# Patient Record
Sex: FEMALE | Race: BLACK OR AFRICAN AMERICAN | Employment: OTHER | URBAN - NONMETROPOLITAN AREA
[De-identification: names, ages, dates, MRNs, and addresses within clinical notes are randomized per-mention and may not be internally consistent; named-entity substitution may affect disease eponyms.]

---

## 2020-12-04 ENCOUNTER — APPOINTMENT (OUTPATIENT)
Dept: GENERAL RADIOLOGY | Age: 70
DRG: 208 | End: 2020-12-04
Attending: HOSPITALIST
Payer: MEDICARE

## 2020-12-04 ENCOUNTER — HOSPITAL ENCOUNTER (INPATIENT)
Age: 70
LOS: 4 days | Discharge: HOME HEALTH | DRG: 208 | End: 2020-12-08
Attending: HOSPITALIST | Admitting: HOSPITALIST
Payer: MEDICARE

## 2020-12-04 ENCOUNTER — APPOINTMENT (OUTPATIENT)
Dept: CT IMAGING | Age: 70
DRG: 208 | End: 2020-12-04
Attending: HOSPITALIST
Payer: MEDICARE

## 2020-12-04 PROBLEM — J96.90 RESPIRATORY FAILURE (HCC): Status: ACTIVE | Noted: 2020-12-04

## 2020-12-04 LAB
ALBUMIN SERPL-MCNC: 3.9 G/DL (ref 3.5–5.2)
ALP BLD-CCNC: 128 U/L (ref 35–104)
ALT SERPL-CCNC: 55 U/L (ref 5–33)
AMMONIA: 19 UMOL/L (ref 11–51)
AMPHETAMINE SCREEN, URINE: NEGATIVE
ANION GAP SERPL CALCULATED.3IONS-SCNC: 12 MMOL/L (ref 7–19)
AST SERPL-CCNC: 144 U/L (ref 5–32)
BACTERIA: NEGATIVE /HPF
BARBITURATE SCREEN URINE: NEGATIVE
BASE EXCESS ARTERIAL: -8.4 MMOL/L (ref -2–2)
BENZODIAZEPINE SCREEN, URINE: POSITIVE
BILIRUB SERPL-MCNC: 0.6 MG/DL (ref 0.2–1.2)
BILIRUBIN URINE: NEGATIVE
BLOOD, URINE: NEGATIVE
BUN BLDV-MCNC: 27 MG/DL (ref 8–23)
CALCIUM SERPL-MCNC: 7.9 MG/DL (ref 8.8–10.2)
CANNABINOID SCREEN URINE: NEGATIVE
CARBOXYHEMOGLOBIN ARTERIAL: 1.9 % (ref 0–5)
CHLORIDE BLD-SCNC: 113 MMOL/L (ref 98–111)
CLARITY: CLEAR
CO2: 19 MMOL/L (ref 22–29)
COCAINE METABOLITE SCREEN URINE: NEGATIVE
COLOR: YELLOW
CREAT SERPL-MCNC: 1.3 MG/DL (ref 0.5–0.9)
CRYSTALS, UA: ABNORMAL /HPF
D DIMER: 1.57 UG/ML FEU (ref 0–0.48)
EKG P AXIS: 85 DEGREES
EKG P-R INTERVAL: 172 MS
EKG Q-T INTERVAL: 508 MS
EKG QRS DURATION: 146 MS
EKG QTC CALCULATION (BAZETT): 505 MS
EKG T AXIS: 55 DEGREES
EPITHELIAL CELLS, UA: 4 /HPF (ref 0–5)
ETHANOL: <10 MG/DL (ref 0–0.08)
FOLATE: 7.4 NG/ML (ref 4.8–37.3)
GFR AFRICAN AMERICAN: 49
GFR NON-AFRICAN AMERICAN: 40
GLUCOSE BLD-MCNC: 127 MG/DL (ref 74–109)
GLUCOSE URINE: NEGATIVE MG/DL
HCO3 ARTERIAL: 19.5 MMOL/L (ref 22–26)
HCT VFR BLD CALC: 36.1 % (ref 37–47)
HEMOGLOBIN, ART, EXTENDED: 10.7 G/DL (ref 12–16)
HEMOGLOBIN: 10.7 G/DL (ref 12–16)
HYALINE CASTS: 3 /HPF (ref 0–8)
KETONES, URINE: NEGATIVE MG/DL
LACTIC ACID: 0.7 MMOL/L (ref 0.5–1.9)
LEUKOCYTE ESTERASE, URINE: NEGATIVE
Lab: ABNORMAL
MAGNESIUM: 1.4 MG/DL (ref 1.6–2.4)
MCH RBC QN AUTO: 30.7 PG (ref 27–31)
MCHC RBC AUTO-ENTMCNC: 29.6 G/DL (ref 33–37)
MCV RBC AUTO: 103.4 FL (ref 81–99)
METHEMOGLOBIN ARTERIAL: 1.4 %
NITRITE, URINE: NEGATIVE
O2 CONTENT ARTERIAL: 16.2 ML/DL
O2 SAT, ARTERIAL: 97.2 %
O2 THERAPY: ABNORMAL
OPIATE SCREEN URINE: NEGATIVE
PCO2 ARTERIAL: 50 MMHG (ref 35–45)
PDW BLD-RTO: 19.3 % (ref 11.5–14.5)
PH ARTERIAL: 7.2 (ref 7.35–7.45)
PH UA: 5.5 (ref 5–8)
PLATELET # BLD: 142 K/UL (ref 130–400)
PMV BLD AUTO: 11.9 FL (ref 9.4–12.3)
PO2 ARTERIAL: 556 MMHG (ref 80–100)
POTASSIUM SERPL-SCNC: 4.5 MMOL/L (ref 3.5–5)
POTASSIUM, WHOLE BLOOD: 4.3
PRO-BNP: 2464 PG/ML (ref 0–900)
PROTEIN UA: 30 MG/DL
RBC # BLD: 3.49 M/UL (ref 4.2–5.4)
RBC UA: 3 /HPF (ref 0–4)
SODIUM BLD-SCNC: 144 MMOL/L (ref 136–145)
SPECIFIC GRAVITY UA: 1.04 (ref 1–1.03)
TOTAL PROTEIN: 5.5 G/DL (ref 6.6–8.7)
TROPONIN: 0.04 NG/ML (ref 0–0.03)
TSH SERPL DL<=0.05 MIU/L-ACNC: 7.85 UIU/ML (ref 0.27–4.2)
UROBILINOGEN, URINE: 0.2 E.U./DL
VITAMIN B-12: 498 PG/ML (ref 211–946)
WBC # BLD: 4.6 K/UL (ref 4.8–10.8)
WBC UA: 2 /HPF (ref 0–5)

## 2020-12-04 PROCEDURE — 36415 COLL VENOUS BLD VENIPUNCTURE: CPT

## 2020-12-04 PROCEDURE — 70450 CT HEAD/BRAIN W/O DYE: CPT

## 2020-12-04 PROCEDURE — G0480 DRUG TEST DEF 1-7 CLASSES: HCPCS

## 2020-12-04 PROCEDURE — 85379 FIBRIN DEGRADATION QUANT: CPT

## 2020-12-04 PROCEDURE — 0BH17EZ INSERTION OF ENDOTRACHEAL AIRWAY INTO TRACHEA, VIA NATURAL OR ARTIFICIAL OPENING: ICD-10-PCS | Performed by: INTERNAL MEDICINE

## 2020-12-04 PROCEDURE — 84132 ASSAY OF SERUM POTASSIUM: CPT

## 2020-12-04 PROCEDURE — 82746 ASSAY OF FOLIC ACID SERUM: CPT

## 2020-12-04 PROCEDURE — 36600 WITHDRAWAL OF ARTERIAL BLOOD: CPT

## 2020-12-04 PROCEDURE — 86738 MYCOPLASMA ANTIBODY: CPT

## 2020-12-04 PROCEDURE — 87070 CULTURE OTHR SPECIMN AEROBIC: CPT

## 2020-12-04 PROCEDURE — 93005 ELECTROCARDIOGRAM TRACING: CPT | Performed by: HOSPITALIST

## 2020-12-04 PROCEDURE — 80307 DRUG TEST PRSMV CHEM ANLYZR: CPT

## 2020-12-04 PROCEDURE — 83605 ASSAY OF LACTIC ACID: CPT

## 2020-12-04 PROCEDURE — 81001 URINALYSIS AUTO W/SCOPE: CPT

## 2020-12-04 PROCEDURE — 87205 SMEAR GRAM STAIN: CPT

## 2020-12-04 PROCEDURE — 5A1935Z RESPIRATORY VENTILATION, LESS THAN 24 CONSECUTIVE HOURS: ICD-10-PCS | Performed by: INTERNAL MEDICINE

## 2020-12-04 PROCEDURE — 84484 ASSAY OF TROPONIN QUANT: CPT

## 2020-12-04 PROCEDURE — 83735 ASSAY OF MAGNESIUM: CPT

## 2020-12-04 PROCEDURE — 82607 VITAMIN B-12: CPT

## 2020-12-04 PROCEDURE — 6360000002 HC RX W HCPCS: Performed by: HOSPITALIST

## 2020-12-04 PROCEDURE — 2700000000 HC OXYGEN THERAPY PER DAY

## 2020-12-04 PROCEDURE — 82140 ASSAY OF AMMONIA: CPT

## 2020-12-04 PROCEDURE — 85027 COMPLETE CBC AUTOMATED: CPT

## 2020-12-04 PROCEDURE — 80053 COMPREHEN METABOLIC PANEL: CPT

## 2020-12-04 PROCEDURE — 93010 ELECTROCARDIOGRAM REPORT: CPT | Performed by: INTERNAL MEDICINE

## 2020-12-04 PROCEDURE — 2580000003 HC RX 258: Performed by: HOSPITALIST

## 2020-12-04 PROCEDURE — 2000000000 HC ICU R&B

## 2020-12-04 PROCEDURE — 95822 EEG COMA OR SLEEP ONLY: CPT

## 2020-12-04 PROCEDURE — 82803 BLOOD GASES ANY COMBINATION: CPT

## 2020-12-04 PROCEDURE — 6370000000 HC RX 637 (ALT 250 FOR IP): Performed by: INTERNAL MEDICINE

## 2020-12-04 PROCEDURE — 87040 BLOOD CULTURE FOR BACTERIA: CPT

## 2020-12-04 PROCEDURE — 83880 ASSAY OF NATRIURETIC PEPTIDE: CPT

## 2020-12-04 PROCEDURE — 94002 VENT MGMT INPAT INIT DAY: CPT

## 2020-12-04 PROCEDURE — 71045 X-RAY EXAM CHEST 1 VIEW: CPT

## 2020-12-04 PROCEDURE — 84443 ASSAY THYROID STIM HORMONE: CPT

## 2020-12-04 PROCEDURE — 2500000003 HC RX 250 WO HCPCS: Performed by: HOSPITALIST

## 2020-12-04 RX ORDER — METHYLPREDNISOLONE SODIUM SUCCINATE 40 MG/ML
40 INJECTION, POWDER, LYOPHILIZED, FOR SOLUTION INTRAMUSCULAR; INTRAVENOUS EVERY 8 HOURS
Status: COMPLETED | OUTPATIENT
Start: 2020-12-04 | End: 2020-12-06

## 2020-12-04 RX ORDER — CARVEDILOL 3.12 MG/1
3.12 TABLET ORAL 2 TIMES DAILY WITH MEALS
Status: DISCONTINUED | OUTPATIENT
Start: 2020-12-05 | End: 2020-12-08 | Stop reason: HOSPADM

## 2020-12-04 RX ORDER — PROPOFOL 10 MG/ML
10 INJECTION, EMULSION INTRAVENOUS
Status: DISCONTINUED | OUTPATIENT
Start: 2020-12-04 | End: 2020-12-05

## 2020-12-04 RX ORDER — SODIUM CHLORIDE 9 MG/ML
INJECTION, SOLUTION INTRAVENOUS CONTINUOUS
Status: DISCONTINUED | OUTPATIENT
Start: 2020-12-04 | End: 2020-12-05

## 2020-12-04 RX ORDER — HYDROCHLOROTHIAZIDE 25 MG/1
25 TABLET ORAL DAILY
COMMUNITY

## 2020-12-04 RX ORDER — CARVEDILOL 12.5 MG/1
12.5 TABLET ORAL 2 TIMES DAILY WITH MEALS
Status: DISCONTINUED | OUTPATIENT
Start: 2020-12-04 | End: 2020-12-04

## 2020-12-04 RX ORDER — METHYLPREDNISOLONE SODIUM SUCCINATE 40 MG/ML
40 INJECTION, POWDER, LYOPHILIZED, FOR SOLUTION INTRAMUSCULAR; INTRAVENOUS EVERY 6 HOURS
Status: DISCONTINUED | OUTPATIENT
Start: 2020-12-04 | End: 2020-12-04

## 2020-12-04 RX ORDER — ANASTROZOLE 1 MG/1
1 TABLET ORAL DAILY
COMMUNITY

## 2020-12-04 RX ORDER — CARVEDILOL 12.5 MG/1
12.5 TABLET ORAL DAILY
COMMUNITY

## 2020-12-04 RX ORDER — DEXMEDETOMIDINE HYDROCHLORIDE 4 UG/ML
0.2 INJECTION, SOLUTION INTRAVENOUS CONTINUOUS
Status: DISCONTINUED | OUTPATIENT
Start: 2020-12-04 | End: 2020-12-05

## 2020-12-04 RX ADMIN — SODIUM CHLORIDE: 9 INJECTION, SOLUTION INTRAVENOUS at 22:24

## 2020-12-04 RX ADMIN — CARVEDILOL 12.5 MG: 12.5 TABLET, FILM COATED ORAL at 17:20

## 2020-12-04 RX ADMIN — PROPOFOL 50 MCG/KG/MIN: 10 INJECTION, EMULSION INTRAVENOUS at 17:19

## 2020-12-04 RX ADMIN — METHYLPREDNISOLONE SODIUM SUCCINATE 40 MG: 40 INJECTION, POWDER, FOR SOLUTION INTRAMUSCULAR; INTRAVENOUS at 08:12

## 2020-12-04 RX ADMIN — DEXMEDETOMIDINE HYDROCHLORIDE 0.2 MCG/KG/HR: 4 INJECTION, SOLUTION INTRAVENOUS at 20:28

## 2020-12-04 RX ADMIN — CEFEPIME HYDROCHLORIDE 1 G: 1 INJECTION, POWDER, FOR SOLUTION INTRAMUSCULAR; INTRAVENOUS at 08:08

## 2020-12-04 RX ADMIN — PROPOFOL 10 MCG/KG/MIN: 10 INJECTION, EMULSION INTRAVENOUS at 08:12

## 2020-12-04 RX ADMIN — METHYLPREDNISOLONE SODIUM SUCCINATE 40 MG: 40 INJECTION, POWDER, FOR SOLUTION INTRAMUSCULAR; INTRAVENOUS at 17:19

## 2020-12-04 RX ADMIN — CEFEPIME HYDROCHLORIDE 1 G: 1 INJECTION, POWDER, FOR SOLUTION INTRAMUSCULAR; INTRAVENOUS at 17:51

## 2020-12-04 ASSESSMENT — PULMONARY FUNCTION TESTS
PIF_VALUE: 14
PIF_VALUE: 25
PIF_VALUE: 17
PIF_VALUE: 18
PIF_VALUE: 21
PIF_VALUE: 19
PIF_VALUE: 20
PIF_VALUE: 15
PIF_VALUE: 15
PIF_VALUE: 18
PIF_VALUE: 18
PIF_VALUE: 0
PIF_VALUE: 17
PIF_VALUE: 19
PIF_VALUE: 37
PIF_VALUE: 19
PIF_VALUE: 21
PIF_VALUE: 17
PIF_VALUE: 0
PIF_VALUE: 18
PIF_VALUE: 17
PIF_VALUE: 20

## 2020-12-04 ASSESSMENT — PAIN SCALES - WONG BAKER
WONGBAKER_NUMERICALRESPONSE: 0

## 2020-12-04 NOTE — PROGRESS NOTES
Results for Lai Baugh (MRN [id number]) as of 12/4/2020 07:51   Ref.  Range 12/4/2020 07:48   Hemoglobin, Art, Extended Latest Ref Range: 12.0 - 16.0 g/dL 10.7 (L)   pH, Arterial Latest Ref Range: 7.350 - 7.450  7.200 (LL)   pCO2, Arterial Latest Ref Range: 35.0 - 45.0 mmHg 50.0 (H)   pO2, Arterial Latest Ref Range: 80.0 - 100.0 mmHg 556.0 (H)   HCO3, Arterial Latest Ref Range: 22.0 - 26.0 mmol/L 19.5 (L)   Base Excess, Arterial Latest Ref Range: -2.0 - 2.0 mmol/L -8.4 (L)   O2 Sat, Arterial Latest Ref Range: >92 % 97.2   O2 Content, Arterial Latest Ref Range: Not Established mL/dL 16.2   Methemoglobin, Arterial Latest Ref Range: <1.5 % 1.4   Carboxyhgb, Arterial Latest Ref Range: 0.0 - 5.0 % 1.9   VC 14 400 +5 100  ABG at RR

## 2020-12-04 NOTE — PROGRESS NOTES
Follow-up note from this morning. Transferred from [facility]. Intubated at their facility for exacerbation of COPD. Will continue supportive care. Wean when feasible.

## 2020-12-04 NOTE — H&P
History and Physical    Patient Name:  Franki Taylor    :  1950    Chief Complaint:   resp failure    History of Present Illness:   Franki Taylor presents to [facility] from [place] with resp failure required intubation. Per report she has COPD AE. EKG showed st elevation that resolved, troponin was neg. Currenly on rate 14, , FiO2 100, and Peep 5. Sedated. Past Medical History:   has a past medical history of HTN (hypertension) and Seizures ([address] Surgical History:   has no past surgical history on file. Social History:   reports that she has been smoking. She does not have any smokeless tobacco history on file. She reports current drug use. She reports that she does not drink alcohol. Family History:  family history includes Cancer in her mother. Medications:  Prior to Admission medications    Medication Sig Start Date End Date Taking? Authorizing Provider   metoprolol (TOPROL XL) 100 MG XL tablet Take 1 tablet by mouth daily 10/1/15   Orlene Kehr, APRN - CNP   folic acid (FOLVITE) 1 MG tablet Take 1 tablet by mouth daily 10/1/15   Orlene Kehr, APRN - CNP   magnesium oxide (MAG-OX) 400 MG tablet Take 1 tablet by mouth daily 9/23/15   Orlene Kehr, APRN - CNP   Cyanocobalamin (B-12) 1000 MCG SUBL Take one tablet daily by mouth. Allow to dissolve under tongue. 9/23/15   Orlene Kehr, APRN - CNP   melatonin 3 MG TABS tablet Take 10 mg by mouth daily    Historical Provider, MD       Allergies:  Patient has no known allergies. Review of Systems:   Not obtainable 2/2 sedation    Physical Examination:    Vital Signs: BP (!) 164/88   Pulse 59   Temp 96.4 °F (35.8 °C) (Temporal)   Resp 17   Ht 5' 3\" (1.6 m)   Wt 92 lb 1.6 oz (41.8 kg)   SpO2 100%   BMI 16.31 kg/m²   General appearance: Well preserved, mesomorphic body habitus, sedated  Skin: Skin color, texture, turgor normal. No rashes or lesions. No induration or tightening palpated. Head: Normocephalic.  No masses, lesions, tenderness or abnormalities  Nose/Sinuses: Nares normal. Septum midline. Mucosa normal. No drainage or sinus tenderness. Neck: Neck supple, and symmetric. No adenopathy. Trachea is midline. Carotids brisk in upstroke without bruits, No abnormal JVP noted at 45°. Lungs: Lungs clear to auscultation bilaterally. No retractions or use of accessory muscles. No vocal fremitus. No ronchi, crackle or rale. Heart:  S1 > S2. Regular rate and rhythm. No gallop, murmur, rub, palpable thrill or heave noted. Abdomen: Abdomen soft, non-tender. BS normal. No masses, organomegaly. No hernia noted. Extremities: Extremities normal. No deformities, edema, or skin discoloration. No cyanosis or clubbing noted to the nails. Peripheral pulses decreased      Pertinent Labs:  CBC: No results for input(s): WBC, RBC, HGB, HCT, MCV, MCH, MCHC, RDW, PLT, MPV in the last 72 hours. BMP:   Recent Labs     12/04/20  0748   K 4.3     ABGs: No results for input(s): PO2, PCO2, PH, HCO3, BE, O2SAT in the last 72 hours. INR: No results for input(s): INR, PROTIME in the last 72 hours. BNP:  No results found for: BNP  TSH:   Lab Results   Component Value Date    TSH 2.62 04/27/2015     Cardiac Injury Profile:   Lab Results   Component Value Date    TROPONINI < 0.01 04/18/2015     Lipid Profile: No components found for: CHLPL  No results found for: TRIG  No results found for: HDL  No results found for: LDLCALC  No results found for: LABVLDL  Hemoglobin A1C:   Lab Results   Component Value Date    LABA1C 5.5 04/18/2015     No results found for: EAG      Assessment/Plan:  · resp failure req intubation- cxr, echo, dd, troponin, ekg  · Copd- steroids, ab  · Reported seizures - eeg, lactic    Patient Active Problem List:     HTN (hypertension)     Memory disturbance     Alcohol withdrawal seizure ([address]                 I have reviewed my findings and recommendations in detail with Kiran Ruiz.     Perla Reyes MD admision level 2

## 2020-12-05 LAB
ANION GAP SERPL CALCULATED.3IONS-SCNC: 13 MMOL/L (ref 7–19)
ANION GAP SERPL CALCULATED.3IONS-SCNC: 15 MMOL/L (ref 7–19)
BUN BLDV-MCNC: 28 MG/DL (ref 8–23)
BUN BLDV-MCNC: 29 MG/DL (ref 8–23)
CALCIUM SERPL-MCNC: 8.2 MG/DL (ref 8.8–10.2)
CALCIUM SERPL-MCNC: 8.5 MG/DL (ref 8.8–10.2)
CHLORIDE BLD-SCNC: 109 MMOL/L (ref 98–111)
CHLORIDE BLD-SCNC: 112 MMOL/L (ref 98–111)
CO2: 13 MMOL/L (ref 22–29)
CO2: 17 MMOL/L (ref 22–29)
CREAT SERPL-MCNC: 1.6 MG/DL (ref 0.5–0.9)
CREAT SERPL-MCNC: 1.6 MG/DL (ref 0.5–0.9)
GFR AFRICAN AMERICAN: 38
GFR AFRICAN AMERICAN: 38
GFR NON-AFRICAN AMERICAN: 32
GFR NON-AFRICAN AMERICAN: 32
GLUCOSE BLD-MCNC: 106 MG/DL (ref 74–109)
GLUCOSE BLD-MCNC: 175 MG/DL (ref 74–109)
HCT VFR BLD CALC: 31.5 % (ref 37–47)
HEMOGLOBIN: 10.1 G/DL (ref 12–16)
LACTIC ACID: 1.6 MMOL/L (ref 0.5–1.9)
MAGNESIUM: 1.2 MG/DL (ref 1.6–2.4)
MAGNESIUM: 2.6 MG/DL (ref 1.6–2.4)
MCH RBC QN AUTO: 31.2 PG (ref 27–31)
MCHC RBC AUTO-ENTMCNC: 32.1 G/DL (ref 33–37)
MCV RBC AUTO: 97.2 FL (ref 81–99)
PDW BLD-RTO: 19.1 % (ref 11.5–14.5)
PLATELET # BLD: 140 K/UL (ref 130–400)
PMV BLD AUTO: 10.5 FL (ref 9.4–12.3)
POTASSIUM SERPL-SCNC: 4.8 MMOL/L (ref 3.5–5)
POTASSIUM SERPL-SCNC: 5.3 MMOL/L (ref 3.5–5)
RBC # BLD: 3.24 M/UL (ref 4.2–5.4)
SODIUM BLD-SCNC: 139 MMOL/L (ref 136–145)
SODIUM BLD-SCNC: 140 MMOL/L (ref 136–145)
WBC # BLD: 8.5 K/UL (ref 4.8–10.8)

## 2020-12-05 PROCEDURE — 6360000002 HC RX W HCPCS: Performed by: INTERNAL MEDICINE

## 2020-12-05 PROCEDURE — 6370000000 HC RX 637 (ALT 250 FOR IP): Performed by: HOSPITALIST

## 2020-12-05 PROCEDURE — 2500000003 HC RX 250 WO HCPCS: Performed by: INTERNAL MEDICINE

## 2020-12-05 PROCEDURE — 2580000003 HC RX 258: Performed by: INTERNAL MEDICINE

## 2020-12-05 PROCEDURE — 6370000000 HC RX 637 (ALT 250 FOR IP): Performed by: INTERNAL MEDICINE

## 2020-12-05 PROCEDURE — 36415 COLL VENOUS BLD VENIPUNCTURE: CPT

## 2020-12-05 PROCEDURE — 2580000003 HC RX 258: Performed by: HOSPITALIST

## 2020-12-05 PROCEDURE — 80048 BASIC METABOLIC PNL TOTAL CA: CPT

## 2020-12-05 PROCEDURE — 6360000002 HC RX W HCPCS: Performed by: HOSPITALIST

## 2020-12-05 PROCEDURE — 2700000000 HC OXYGEN THERAPY PER DAY

## 2020-12-05 PROCEDURE — 83605 ASSAY OF LACTIC ACID: CPT

## 2020-12-05 PROCEDURE — 95816 EEG AWAKE AND DROWSY: CPT | Performed by: PSYCHIATRY & NEUROLOGY

## 2020-12-05 PROCEDURE — 1210000000 HC MED SURG R&B

## 2020-12-05 PROCEDURE — 85027 COMPLETE CBC AUTOMATED: CPT

## 2020-12-05 PROCEDURE — 2500000003 HC RX 250 WO HCPCS: Performed by: HOSPITALIST

## 2020-12-05 PROCEDURE — 83735 ASSAY OF MAGNESIUM: CPT

## 2020-12-05 RX ORDER — MONTELUKAST SODIUM 10 MG/1
10 TABLET ORAL NIGHTLY
Status: DISCONTINUED | OUTPATIENT
Start: 2020-12-05 | End: 2020-12-08 | Stop reason: HOSPADM

## 2020-12-05 RX ORDER — SODIUM POLYSTYRENE SULFONATE 15 G/60ML
15 SUSPENSION ORAL; RECTAL ONCE
Status: COMPLETED | OUTPATIENT
Start: 2020-12-05 | End: 2020-12-05

## 2020-12-05 RX ORDER — BUDESONIDE 0.5 MG/2ML
0.5 INHALANT ORAL 2 TIMES DAILY
Status: DISCONTINUED | OUTPATIENT
Start: 2020-12-05 | End: 2020-12-08 | Stop reason: HOSPADM

## 2020-12-05 RX ORDER — BUDESONIDE AND FORMOTEROL FUMARATE DIHYDRATE 160; 4.5 UG/1; UG/1
2 AEROSOL RESPIRATORY (INHALATION) 2 TIMES DAILY
Status: DISCONTINUED | OUTPATIENT
Start: 2020-12-05 | End: 2020-12-05 | Stop reason: CLARIF

## 2020-12-05 RX ORDER — MAGNESIUM SULFATE IN WATER 40 MG/ML
4 INJECTION, SOLUTION INTRAVENOUS ONCE
Status: COMPLETED | OUTPATIENT
Start: 2020-12-05 | End: 2020-12-05

## 2020-12-05 RX ORDER — 0.9 % SODIUM CHLORIDE 0.9 %
500 INTRAVENOUS SOLUTION INTRAVENOUS ONCE
Status: COMPLETED | OUTPATIENT
Start: 2020-12-05 | End: 2020-12-05

## 2020-12-05 RX ORDER — ARFORMOTEROL TARTRATE 15 UG/2ML
15 SOLUTION RESPIRATORY (INHALATION) 2 TIMES DAILY
Status: DISCONTINUED | OUTPATIENT
Start: 2020-12-05 | End: 2020-12-08 | Stop reason: HOSPADM

## 2020-12-05 RX ADMIN — MAGNESIUM SULFATE HEPTAHYDRATE 3 G: 500 INJECTION, SOLUTION INTRAMUSCULAR; INTRAVENOUS at 04:44

## 2020-12-05 RX ADMIN — MAGNESIUM SULFATE HEPTAHYDRATE 4 G: 40 INJECTION, SOLUTION INTRAVENOUS at 08:54

## 2020-12-05 RX ADMIN — MONTELUKAST SODIUM 10 MG: 10 TABLET, FILM COATED ORAL at 19:57

## 2020-12-05 RX ADMIN — CARVEDILOL 3.12 MG: 3.12 TABLET, FILM COATED ORAL at 08:54

## 2020-12-05 RX ADMIN — METHYLPREDNISOLONE SODIUM SUCCINATE 40 MG: 40 INJECTION, POWDER, FOR SOLUTION INTRAMUSCULAR; INTRAVENOUS at 18:42

## 2020-12-05 RX ADMIN — SODIUM BICARBONATE: 84 INJECTION, SOLUTION INTRAVENOUS at 08:54

## 2020-12-05 RX ADMIN — CEFEPIME HYDROCHLORIDE 1 G: 1 INJECTION, POWDER, FOR SOLUTION INTRAMUSCULAR; INTRAVENOUS at 06:24

## 2020-12-05 RX ADMIN — SODIUM CHLORIDE 500 ML: 9 INJECTION, SOLUTION INTRAVENOUS at 04:32

## 2020-12-05 RX ADMIN — SODIUM POLYSTYRENE SULFONATE 15 G: 15 SUSPENSION ORAL; RECTAL at 04:45

## 2020-12-05 RX ADMIN — SODIUM BICARBONATE: 84 INJECTION, SOLUTION INTRAVENOUS at 06:25

## 2020-12-05 RX ADMIN — METHYLPREDNISOLONE SODIUM SUCCINATE 40 MG: 40 INJECTION, POWDER, FOR SOLUTION INTRAMUSCULAR; INTRAVENOUS at 01:03

## 2020-12-05 RX ADMIN — CARVEDILOL 3.12 MG: 3.12 TABLET, FILM COATED ORAL at 18:41

## 2020-12-05 RX ADMIN — METHYLPREDNISOLONE SODIUM SUCCINATE 40 MG: 40 INJECTION, POWDER, FOR SOLUTION INTRAMUSCULAR; INTRAVENOUS at 08:54

## 2020-12-05 ASSESSMENT — PULMONARY FUNCTION TESTS
PIF_VALUE: 18
PIF_VALUE: 11
PIF_VALUE: 21
PIF_VALUE: 19
PIF_VALUE: 19
PIF_VALUE: 17
PIF_VALUE: 10
PIF_VALUE: 17
PIF_VALUE: 11
PIF_VALUE: 16
PIF_VALUE: 24
PIF_VALUE: 28
PIF_VALUE: 18

## 2020-12-05 ASSESSMENT — PAIN SCALES - WONG BAKER
WONGBAKER_NUMERICALRESPONSE: 0

## 2020-12-05 ASSESSMENT — ENCOUNTER SYMPTOMS
COLOR CHANGE: 0
VOMITING: 0
NAUSEA: 0
DIARRHEA: 0
SHORTNESS OF BREATH: 0
BACK PAIN: 0
VOICE CHANGE: 0
CONSTIPATION: 0
COUGH: 0
RHINORRHEA: 0

## 2020-12-05 NOTE — PROGRESS NOTES
4 Eyes Skin Assessment    Breanna Obrien is being assessed upon: Transfer to New Unit    I agree that Rylie Damico, along with Zita Sanches RN (either 2 RN's or 1 LPN and 1 RN) have performed a thorough Head to Toe Skin Assessment on the patient. ALL assessment sites listed below have been assessed. Areas assessed by both nurses:     [x]   Head, Face, and Ears   [x]   Shoulders, Back, and Chest  [x]   Arms, Elbows, and Hands   []   Coccyx, Sacrum, and Ischium Pt refused to let us look at her buttocks. [x]   Legs, Feet, and Heels    Does the Patient have Skin Breakdown?  No    Mike Prevention initiated: No  Wound Care Orders initiated: NA     [facility] nurse consulted for Pressure Injury (Stage 3,4, Unstageable, DTI, NWPT, and Complex wounds) and New or Established Ostomies: No        Primary Nurse eSignature: Latricia Epley, RN on 12/5/2020 at 3:54 PM      Co-Signer eSignature: Electronically signed by Zita Sanches RN on 12/5/20 at 6:38 PM CST

## 2020-12-05 NOTE — PROGRESS NOTES
aDgo Tovar received from ICU to room # 320 . Mental Status: Patient is oriented, alert, coherent, logical, thought processes intact and able to concentrate and follow conversation. Vitals:    12/05/20 1419   BP:    Pulse: 78   Resp:    Temp:    SpO2:      Placed on cardiac monitor: No.  Belongings: None with patient at bedside . Family at bedside No.  Oriented Patient to room. Call light within reach. Yes. Transfer was: Well tolerated by patient. .    Electronically signed by Pamela Chacko RN on 12/5/2020 at 4:34 PM

## 2020-12-05 NOTE — PLAN OF CARE
Problem: Restraint Use - Nonviolent/Non-Self-Destructive Behavior:  Goal: Absence of restraint indications  Description: Absence of restraint indications  12/4/2020 2320 by Zee Vieira RN  Outcome: Ongoing  12/4/2020 1537 by Elpidio Glynn RN  Outcome: Ongoing  Goal: Absence of restraint-related injury  Description: Absence of restraint-related injury  12/4/2020 2320 by Zee Vieira RN  Outcome: Ongoing  12/4/2020 1537 by Elpidio Glynn RN  Outcome: Ongoing     Problem: Skin Integrity:  Goal: Will show no infection signs and symptoms  Description: Will show no infection signs and symptoms  12/4/2020 2320 by Zee Vieira RN  Outcome: Ongoing  12/4/2020 1537 by Elpidio Glynn RN  Outcome: Ongoing  Goal: Absence of new skin breakdown  Description: Absence of new skin breakdown  12/4/2020 2320 by Zee Vieira RN  Outcome: Ongoing  12/4/2020 1537 by Elpidio Glynn RN  Outcome: Ongoing     Problem: Falls - Risk of:  Goal: Will remain free from falls  Description: Will remain free from falls  12/4/2020 2320 by Zee Vieira RN  Outcome: Ongoing  12/4/2020 1537 by Elpidio Glynn RN  Outcome: Ongoing  Goal: Absence of physical injury  Description: Absence of physical injury  12/4/2020 2320 by Zee Vieira RN  Outcome: Ongoing  12/4/2020 1537 by Elpidio Glynn RN  Outcome: Ongoing

## 2020-12-05 NOTE — PROGRESS NOTES
Hospitalist Progress Note    Patient:  Calvin Loomis  YOB: 1950  Date of Service: 12/5/2020  MRN: [id number]   Acct: [de-identified]   Primary Care Physician: No primary care provider on file. Advance Directive: Full Code  Admit Date: 12/4/2020       Hospital Day: 1  Referring Provider: Pete Rodarte DO    Patient Seen, Chart, Consults, Notes, Labs, Radiology studies reviewed. Subjective: Calvin Loomis is a 79 y.o. female  whom we are following for acute hypercapnic respiratory failure. She was successfully liberated from the ventilator this morning. She is doing very well. She does have a history of COPD. She states that she has never been on the ventilator before. She does use inhalers at home. She is very comfortable and clinically stable. I will go ahead and transfer out of ICU. Allergies:  Patient has no known allergies.     Medicines:  Current Facility-Administered Medications   Medication Dose Route Frequency Provider Last Rate Last Dose    magnesium sulfate 4 g in 100 mL IVPB premix  4 g Intravenous Once Pete Rodarte DO 12.5 mL/hr at 12/05/20 0854 4 g at 12/05/20 0854    sodium bicarbonate 150 mEq in dextrose 5 % 1,000 mL infusion   Intravenous Continuous Pete Rodarte  mL/hr at 12/05/20 2304      propofol injection  10 mcg/kg/min Intravenous Titrated Herb Wheatley MD   Stopped at 12/05/20 1696    methylPREDNISolone sodium (SOLU-MEDROL) injection 40 mg  40 mg Intravenous Q8H Herb Wheatley MD   40 mg at 12/05/20 0854    influenza quadrivalent subunit vaccine (FLUCELVAX) injection 0.5 mL  0.5 mL Intramuscular Prior to discharge Pete Rodarte DO        dexmedetomidine (PRECEDEX) 400 mcg in sodium chloride 0.9 % 100 mL infusion  0.2 mcg/kg/hr Intravenous Continuous Herb Wheatley MD 9.4 mL/hr at 12/05/20 0716 0.9 mcg/kg/hr at 12/05/20 0716    carvedilol (COREG) tablet 3.125 mg  3.125 mg Oral BID MAYA Cummins Valery De Jesus MD   3.125 mg at 12/05/20 0854       Past Medical History:  Past Medical History:   Diagnosis Date    Cancer [facility])     [facility] (chronic [facility] disease) [address]     HTN (hypertension) 5/27/2015    Seizures (HCC)        Past Surgical History:  Past Surgical History:   Procedure Laterality Date    BREAST SURGERY      LUNG BIOPSY         Family History  Family History   Problem Relation Age of Onset    Cancer Mother        Social History  Social History     Socioeconomic History    Marital status: Single     Spouse name: Not on file    Number of children: Not on file    Years of education: Not on file    Highest education level: Not on file   Occupational History    Not on file   Social Needs    Financial resource strain: Not on file    Food insecurity     Worry: Not on file     Inability: Not on file    Transportation needs     Medical: Not on file     Non-medical: Not on file   Tobacco Use    Smoking status: Current Every Day Smoker     Types: Cigarettes    Smokeless tobacco: Never Used   Substance and Sexual Activity    Alcohol use: No     Alcohol/week: 0.0 standard drinks     Comment: history of alcohol abuse    Drug use: Yes     Comment: joint once or twice a week    Sexual activity: Not on file   Lifestyle    Physical activity     Days per week: Not on file     Minutes per session: Not on file    Stress: Not on file   Relationships    Social connections     Talks on phone: Not on file     Gets together: Not on file     Attends Muslim service: Not on file     Active member of club or organization: Not on file     Attends meetings of clubs or organizations: Not on file     Relationship status: Not on file    Intimate partner violence     Fear of current or ex partner: Not on file     Emotionally abused: Not on file     Physically abused: Not on file     Forced sexual activity: Not on file   Other Topics Concern    Not on file   Social History Narrative    Not on TECHNIQUE: Multiple axial images were obtained through the brain without contrast infusion. Multiplanar images were reconstructed. DLP: 711 mGy-cm. Automated exposure control was utilized. COMPARISON: No comparison study. FINDINGS: There are no hemorrhage, edema or mass effect. There are chronic lacunar infarcts in the thalami bilaterally. There is low-density in the hemispheric white matter. There is mild atrophy. There is no significant ventricular dilatation. There is fluid or mucosal thickening in the sphenoid sinuses bilaterally. The mastoid air cells are clear. There is no calvarial fracture. 1. No intracranial hemorrhage, edema or mass effect. 2. Chronic lacunar infarcts in the thalami bilaterally. 3. Low-density in the hemispheric white matter is nonspecific and likely due to chronic small vessel disease. Vascular calcification is noted. 4. Fluid in the sphenoid sinuses bilaterally suggesting acute sinusitis. Signed by Dr Jordana Blankenship on 12/4/2020 6:01 PM    Xr Chest Portable    Result Date: 12/4/2020  XR CHEST PORTABLE 12/4/2020 12:13 PM History: Respiratory failure. Portable chest x-ray compared with 18 April 2015. Endotracheal tube present. The tip lies 27 mm above the booker. Nasogastric tube present and extends to at least the mid stomach, distal tip not visualized. Fully expanded lungs with chronic appearing interstitial disease. No focal infiltrate is seen. No pneumothorax or heart failure. 1. Endotracheal tube and nasogastric tube present. The nasogastric tube placed to the left of midline of the patient is rotated slightly and this is acceptable. 2. Interstitial lung disease with no pneumonia or pneumothorax. Signed by Dr Xavier Nevarez on 12/4/2020 12:15 PM       Assessment     Acute hypoxemic respiratory failure. Continue O2 support. Liberated from the ventilator under my supervision this morning. Exacerbation of COPD. Adjust chronic medications.     Transfer out of ICU.    Please document 38 minutes of critical care time for patient assessment, chart review, discussion with staff, .       Gail oCx,

## 2020-12-05 NOTE — PROGRESS NOTES
[facility] had a decrease in blood pressure. Decreased sedation. Patient was able to follow commands. Had strong  to both hands. Shook head to questions and was able to wiggle toes. Patient respiratory rate did not increase during this time. Patient was made aware that she was transferred to a different facility and that she was stable at this time. Patient does wake up scared but once she notices she is in the hospital she calms down. I asked if she remembers that she here and will shake head yes.  Patient is currently resting clam on the vent

## 2020-12-05 NOTE — PROGRESS NOTES
Mayco Hunt transferred to [facility] from [facility] via bed. Reason for transfer: medically stable for transfer to [facility]-surg   Explained reason for transfer to Patient and Family. Belongings: phone and keys with patient at bedside . Soft chart transferred with patient: Yes. Telemetry box number N/A transferred with patient: N/A. Report given to: MERCEDEZ Valadez, via telephone.       Electronically signed by Jose Hutchinson RN on 12/5/2020 at 2:02 PM

## 2020-12-05 NOTE — PROCEDURES
Patient:   Elisa Lopez  MR#:    [id number]   Room:    3489/535-39   YOB: 1950  Date of Progress Note: 12/5/2020  Time of Note                           7:01 AM  Consulting Physician:   Elena Dixon M.D. Attending Physician:  Dav Haque,          This is a multichannel digital EEG recording using the international 10-20 placement system. Technical Summary:     Background EEG activity: The occipital dominant rhythm is 11-12 Hz. There is much low to moderate voltage 8-10 Hz activity seen in a generalized distribution intermixed with low voltage 18-22 Hz activity. There is low voltage 16-24 Hz activity seen in a generalized distribution. Photic Stimulation: No abnormal waveforms are noted with various frequencies of flickering light. IMPRESSION:   This is an unremarkable EEG. No clear epileptiform activity was noted during the recording period. The excess fast activity is most likely related to the medications the patient is receiving.       Dr Tex Bahena Certified in Neurology  Board Certified in Clinical Neurophysiology  Fellowship trained in [address] [place] [address] Time 29 minutes

## 2020-12-06 PROBLEM — E87.5 HYPERKALEMIA: Status: ACTIVE | Noted: 2020-12-06

## 2020-12-06 PROBLEM — E83.42 HYPOMAGNESEMIA: Status: ACTIVE | Noted: 2020-12-06

## 2020-12-06 PROBLEM — R79.89 ELEVATED BRAIN NATRIURETIC PEPTIDE (BNP) LEVEL: Status: ACTIVE | Noted: 2020-12-06

## 2020-12-06 LAB
ANION GAP SERPL CALCULATED.3IONS-SCNC: 13 MMOL/L (ref 7–19)
BUN BLDV-MCNC: 29 MG/DL (ref 8–23)
CALCIUM SERPL-MCNC: 8 MG/DL (ref 8.8–10.2)
CHLORIDE BLD-SCNC: 105 MMOL/L (ref 98–111)
CO2: 20 MMOL/L (ref 22–29)
CREAT SERPL-MCNC: 1.6 MG/DL (ref 0.5–0.9)
CULTURE, RESPIRATORY: NORMAL
GFR AFRICAN AMERICAN: 38
GFR NON-AFRICAN AMERICAN: 32
GLUCOSE BLD-MCNC: 118 MG/DL (ref 74–109)
GRAM STAIN RESULT: NORMAL
HCT VFR BLD CALC: 28.8 % (ref 37–47)
HEMOGLOBIN: 9.3 G/DL (ref 12–16)
MCH RBC QN AUTO: 30.7 PG (ref 27–31)
MCHC RBC AUTO-ENTMCNC: 32.3 G/DL (ref 33–37)
MCV RBC AUTO: 95 FL (ref 81–99)
MYCOPLASMA PNEUMONIAE IGG: 0.03 U/L
MYCOPLASMA PNEUMONIAE IGM: 0.06 U/L
PDW BLD-RTO: 19.1 % (ref 11.5–14.5)
PLATELET # BLD: 144 K/UL (ref 130–400)
PMV BLD AUTO: 10.4 FL (ref 9.4–12.3)
POTASSIUM SERPL-SCNC: 4 MMOL/L (ref 3.5–5)
RBC # BLD: 3.03 M/UL (ref 4.2–5.4)
SARS-COV-2, NAAT: NOT DETECTED
SODIUM BLD-SCNC: 138 MMOL/L (ref 136–145)
WBC # BLD: 8.3 K/UL (ref 4.8–10.8)

## 2020-12-06 PROCEDURE — 80048 BASIC METABOLIC PNL TOTAL CA: CPT

## 2020-12-06 PROCEDURE — 36415 COLL VENOUS BLD VENIPUNCTURE: CPT

## 2020-12-06 PROCEDURE — 94640 AIRWAY INHALATION TREATMENT: CPT

## 2020-12-06 PROCEDURE — 1210000000 HC MED SURG R&B

## 2020-12-06 PROCEDURE — 6370000000 HC RX 637 (ALT 250 FOR IP): Performed by: INTERNAL MEDICINE

## 2020-12-06 PROCEDURE — 2700000000 HC OXYGEN THERAPY PER DAY

## 2020-12-06 PROCEDURE — 85027 COMPLETE CBC AUTOMATED: CPT

## 2020-12-06 PROCEDURE — 6360000002 HC RX W HCPCS: Performed by: INTERNAL MEDICINE

## 2020-12-06 PROCEDURE — 2580000003 HC RX 258: Performed by: INTERNAL MEDICINE

## 2020-12-06 PROCEDURE — U0002 COVID-19 LAB TEST NON-CDC: HCPCS

## 2020-12-06 PROCEDURE — 2500000003 HC RX 250 WO HCPCS: Performed by: INTERNAL MEDICINE

## 2020-12-06 RX ORDER — HEPARIN SODIUM 5000 [USP'U]/ML
5000 INJECTION, SOLUTION INTRAVENOUS; SUBCUTANEOUS EVERY 8 HOURS SCHEDULED
Status: DISCONTINUED | OUTPATIENT
Start: 2020-12-06 | End: 2020-12-08 | Stop reason: HOSPADM

## 2020-12-06 RX ADMIN — CARVEDILOL 3.12 MG: 3.12 TABLET, FILM COATED ORAL at 09:35

## 2020-12-06 RX ADMIN — SODIUM BICARBONATE: 84 INJECTION, SOLUTION INTRAVENOUS at 04:44

## 2020-12-06 RX ADMIN — ARFORMOTEROL TARTRATE 15 MCG: 15 SOLUTION RESPIRATORY (INHALATION) at 21:54

## 2020-12-06 RX ADMIN — IPRATROPIUM BROMIDE 0.5 MG: 0.5 SOLUTION RESPIRATORY (INHALATION) at 21:53

## 2020-12-06 RX ADMIN — ARFORMOTEROL TARTRATE 30 MCG: 15 SOLUTION RESPIRATORY (INHALATION) at 11:48

## 2020-12-06 RX ADMIN — IPRATROPIUM BROMIDE 0.5 MG: 0.5 SOLUTION RESPIRATORY (INHALATION) at 14:24

## 2020-12-06 RX ADMIN — BUDESONIDE 500 MCG: 0.5 SUSPENSION RESPIRATORY (INHALATION) at 11:49

## 2020-12-06 RX ADMIN — MONTELUKAST SODIUM 10 MG: 10 TABLET, FILM COATED ORAL at 21:18

## 2020-12-06 RX ADMIN — CARVEDILOL 3.12 MG: 3.12 TABLET, FILM COATED ORAL at 18:05

## 2020-12-06 RX ADMIN — METHYLPREDNISOLONE SODIUM SUCCINATE 40 MG: 40 INJECTION, POWDER, FOR SOLUTION INTRAMUSCULAR; INTRAVENOUS at 04:01

## 2020-12-06 RX ADMIN — METHYLPREDNISOLONE SODIUM SUCCINATE 40 MG: 40 INJECTION, POWDER, FOR SOLUTION INTRAMUSCULAR; INTRAVENOUS at 09:35

## 2020-12-06 RX ADMIN — IPRATROPIUM BROMIDE 0.5 MG: 0.5 SOLUTION RESPIRATORY (INHALATION) at 11:48

## 2020-12-06 RX ADMIN — BUDESONIDE 500 MCG: 0.5 SUSPENSION RESPIRATORY (INHALATION) at 21:53

## 2020-12-06 NOTE — PLAN OF CARE
Problem: Restraint Use - Nonviolent/Non-Self-Destructive Behavior:  Goal: Absence of restraint indications  Description: Absence of restraint indications  Outcome: Ongoing  Goal: Absence of restraint-related injury  Description: Absence of restraint-related injury  Outcome: Ongoing

## 2020-12-06 NOTE — PROGRESS NOTES
[facility] Progress Note    Patient:  Vilma Romero  YOB: 1950  Date of Service: 12/6/2020  MRN: [id number]   Acct: [de-identified]   Primary Care Physician: No primary care provider on file. Advance Directive: Full Code  Admit Date: 12/4/2020       Hospital Day: 2        CHIEF COMPLAINT: Shortness of breath      12/6/2020 11:25 AM  Subjective / Interval History:   12/06/2020  Patient extubated and transferred from the ICU: 12/05/2020  Writer assumes care of patient this a.m. Shortness of breath improved. Doing well. No acute changes or acute overnight event reported. Patient with no new complaints. Brief History:   Ms Jerardo Herbert, a pleasant 66-year-old female, history of COPD, transferred from [facility] to [facility] (12/04/2020), on account of acute hypoxic and hypercarbic respiratory failure. Patient presented to [facility], on invasive mechanical ventilation. Review of Systems:   Review of Systems  ROS: 14 point review of systems is negative except as specifically addressed above. DIET GENERAL;     Intake/Output Summary (Last 24 hours) at 12/6/2020 1125  Last data filed at 12/6/2020 0215  Gross per 24 hour   Intake 896 ml   Output 660 ml   Net 236 ml       Medications:   IV infusion builder 100 mL/hr at 12/06/20 0444     Current Facility-Administered Medications   Medication Dose Route Frequency Provider Last Rate Last Dose    sodium bicarbonate 150 mEq in dextrose 5 % 1,000 mL infusion   Intravenous Continuous Nancye Britt,  mL/hr at 12/06/20 0444      montelukast (SINGULAIR) tablet 10 mg  10 mg Oral Nightly Nancye Britt, DO   10 mg at 12/05/20 1957    ipratropium (ATROVENT) 0.02 % nebulizer solution 0.5 mg  0.5 mg Nebulization 4x daily Nancye Britt, DO        Arformoterol Tartrate [facility]) nebulizer solution 15 mcg  15 mcg Nebulization BID Nancye Britt, DO        And    budesonide (PULMICORT) nebulizer suspension 500 mcg  0.5 mg Nebulization BID [facility] last 72 hours. ABGs:   Lab Results   Component Value Date    PHART 7.200 12/04/2020    PO2ART 556.0 12/04/2020    NWV7OII 50.0 12/04/2020     UA:  Recent Labs     12/04/20  0822   COLORU YELLOW   PHUR 5.5   WBCUA 2   RBCUA 3   BACTERIA NEGATIVE*   CLARITYU Clear   SPECGRAV 1.037   LEUKOCYTESUR Negative   UROBILINOGEN 0.2   BILIRUBINUR Negative   BLOODU Negative   GLUCOSEU Negative         Culture Results:    No results for input(s): CXSURG in the last 720 hours. Blood Culture Recent:   Recent Labs     12/04/20  0713   BC No Growth to date. Any change in status will be called. Cultures:   No results for input(s): CULTURE in the last 72 hours. Recent Labs     12/04/20  0713 12/04/20  0756   BC No Growth to date. Any change in status will be called. --    BLOODCULT2  --  No Growth to date. Any change in status will be called. No results for input(s): CXSURG in the last 72 hours. Recent Labs     12/04/20  0756 12/05/20  0240 12/05/20  1031   MG 1.4* 1.2* 2.6*     Recent Labs     12/04/20  0756   *   ALT 55*   BILITOT 0.6   ALKPHOS 128*         RAD:   Ct Head Wo Contrast    Result Date: 12/4/2020  EXAMINATION:  CT HEAD WO CONTRAST  12/4/2020 5:59 PM HISTORY: Altered mental status. TECHNIQUE: Multiple axial images were obtained through the brain without contrast infusion. Multiplanar images were reconstructed. DLP: 711 mGy-cm. Automated exposure control was utilized. COMPARISON: No comparison study. FINDINGS: There are no hemorrhage, edema or mass effect. There are chronic lacunar infarcts in the thalami bilaterally. There is low-density in the hemispheric white matter. There is mild atrophy. There is no significant ventricular dilatation. There is fluid or mucosal thickening in the sphenoid sinuses bilaterally. The mastoid air cells are clear. There is no calvarial fracture. 1. No intracranial hemorrhage, edema or mass effect. 2. Chronic lacunar infarcts in the thalami bilaterally.  3. Low-density in the hemispheric white matter is nonspecific and likely due to chronic small vessel disease. Vascular calcification is noted. 4. Fluid in the sphenoid sinuses bilaterally suggesting acute sinusitis. Signed by Dr Zandra Ledezma on 12/4/2020 6:01 PM    Xr Chest Portable    Result Date: 12/4/2020  XR CHEST PORTABLE 12/4/2020 12:13 PM History: Respiratory failure. Portable chest x-ray compared with 18 April 2015. Endotracheal tube present. The tip lies 27 mm above the booker. Nasogastric tube present and extends to at least the mid stomach, distal tip not visualized. Fully expanded lungs with chronic appearing interstitial disease. No focal infiltrate is seen. No pneumothorax or heart failure. 1. Endotracheal tube and nasogastric tube present. The nasogastric tube placed to the left of midline of the patient is rotated slightly and this is acceptable. 2. Interstitial lung disease with no pneumonia or pneumothorax. Signed by Dr Wendel Aase on 12/4/2020 12:15 PM      Echo:   pending      Objective:   Vitals:   BP (!) 156/85   Pulse 80   Temp 98.5 °F (36.9 °C)   Resp 20   Ht 5' 3\" (1.6 m)   Wt 108 lb 14.4 oz (49.4 kg)   SpO2 98%   BMI 19.29 kg/m²       Patient Vitals for the past 24 hrs:   BP Temp Temp src Pulse Resp SpO2 Weight   12/06/20 0639 (!) 156/85 98.5 °F (36.9 °C) -- 80 20 98 % --   12/06/20 0137 (!) 162/89 97.8 °F (36.6 °C) Temporal 90 16 99 % 108 lb 14.4 oz (49.4 kg)   12/05/20 1833 136/79 97.8 °F (36.6 °C) Temporal 91 16 100 % --   12/05/20 1419 -- -- -- 78 -- -- --   12/05/20 1410 127/76 99.3 °F (37.4 °C) -- 85 20 -- --   12/05/20 1300 (!) 152/101 -- -- 84 25 (!) 55 % --   12/05/20 1200 (!) 189/90 98.4 °F (36.9 °C) Temporal 76 16 -- --       24HR INTAKE/OUTPUT:      Intake/Output Summary (Last 24 hours) at 12/6/2020 1125  Last data filed at 12/6/2020 0215  Gross per 24 hour   Intake 896 ml   Output 660 ml   Net 236 ml       Physical Exam  Vitals signs and nursing note reviewed. Constitutional:       General: She is not in acute distress. Appearance: Normal appearance. She is not ill-appearing, toxic-appearing or diaphoretic. HENT:      Head: Normocephalic and atraumatic. Right Ear: External ear normal.      Left Ear: External ear normal.      Nose: Nose normal. No congestion or rhinorrhea. Mouth/Throat:      Mouth: Mucous membranes are moist.      Pharynx: Oropharynx is clear. No oropharyngeal exudate or posterior oropharyngeal erythema. Eyes:      General: No scleral icterus. Right eye: No discharge. Left eye: No discharge. Extraocular Movements: Extraocular movements intact. Conjunctiva/sclera: Conjunctivae normal.      Pupils: Pupils are equal, round, and reactive to light. Neck:      Musculoskeletal: Normal range of motion and neck supple. No neck rigidity or muscular tenderness. Vascular: No carotid bruit. Cardiovascular:      Rate and Rhythm: Normal rate and regular rhythm. Pulses: Normal pulses. Heart sounds: Normal heart sounds. No murmur. No friction rub. No gallop. Pulmonary:      Effort: Pulmonary effort is normal. No respiratory distress. Breath sounds: No stridor. Wheezing ( Mild bilateral expiratory wheezes) present. No rhonchi or rales. Chest:      Chest wall: No tenderness. Abdominal:      General: Bowel sounds are normal. There is no distension. Palpations: Abdomen is soft. Tenderness: There is no abdominal tenderness. There is no guarding or rebound. Musculoskeletal: Normal range of motion. General: No swelling, tenderness, deformity or signs of injury. Right lower leg: No edema. Left lower leg: No edema. Skin:     General: Skin is warm and dry. Capillary Refill: Capillary refill takes less than 2 seconds. Coloration: Skin is not jaundiced or pale. Findings: No bruising, erythema, lesion or rash. Neurological:      General: No focal deficit present. Mental Status: She is alert and oriented to person, place, and time. Cranial Nerves: No cranial nerve deficit. Sensory: No sensory deficit. Motor: No weakness. Coordination: Coordination normal.   Psychiatric:         Mood and Affect: Mood normal.         Behavior: Behavior normal.         Thought Content: Thought content normal.         Judgment: Judgment normal.           Assessment/plan:         Hospital Problems           Last Modified POA    HTN (hypertension) 12/6/2020 Yes    Alcohol withdrawal seizure ([address] 12/5/2020 Yes    Respiratory failure ([address] 12/4/2020 Yes    Seizures ([address] 12/6/2020 Yes    Elevated brain natriuretic peptide (BNP) level 12/6/2020 Yes    Hyperkalemia 12/6/2020 Yes    Hypomagnesemia 12/6/2020 Yes          Active Problems:    HTN (hypertension)    Alcohol withdrawal seizure ([address]    Respiratory failure (HCC)    Seizures (HCC)    Elevated brain natriuretic peptide (BNP) level    Hyperkalemia    Hypomagnesemia  Resolved Problems:    * No resolved hospital problems. *      Acute hypoxic and hypercarbic respiratory failure  History of COPD, with acute exacerbation  · - Requiring oxygen supplementation to keep SPO2 >89  · - Nebulized Bronchodilators scheduled and on as-needed basis. · Ipratropium-Albuterol (DuoNeb's) nebulizer every 6 hours scheduled  · Albuterol nebulizer every 6  hours when necessary for shortness of breath and/or wheezing. · Arformoterol Tartrate (BROVANA) 15 mcg  Nebs BID   · Budesonide (PULMICORT)  500 mcg  Neb Q12H   · - Systemic Steroids    · --> Methylprednisolone (Solu-Medrol) 40 mg IV every 8 hours  · - No acute infiltrates on Chest X-ray   · - Consider BiPAP to help work of breathing if no improvement.   · - Continue to monitor    Elevated proBNP  · No history of CHF  · Possibly secondary to CKD  · 2D echocardiogram pending    Hyperkalemia  · Resolved  · Monitor potassium level      History of seizures  · Neurology following-appreciate

## 2020-12-07 LAB
BASOPHILS ABSOLUTE: 0 K/UL (ref 0–0.2)
BASOPHILS RELATIVE PERCENT: 0 % (ref 0–1)
EOSINOPHILS ABSOLUTE: 0 K/UL (ref 0–0.6)
EOSINOPHILS RELATIVE PERCENT: 0.1 % (ref 0–5)
HCT VFR BLD CALC: 29.4 % (ref 37–47)
HEMOGLOBIN: 9.6 G/DL (ref 12–16)
IMMATURE GRANULOCYTES #: 0.1 K/UL
LYMPHOCYTES ABSOLUTE: 1.5 K/UL (ref 1.1–4.5)
LYMPHOCYTES RELATIVE PERCENT: 18.7 % (ref 20–40)
MAGNESIUM: 2.2 MG/DL (ref 1.6–2.4)
MCH RBC QN AUTO: 31.3 PG (ref 27–31)
MCHC RBC AUTO-ENTMCNC: 32.7 G/DL (ref 33–37)
MCV RBC AUTO: 95.8 FL (ref 81–99)
MONOCYTES ABSOLUTE: 0.7 K/UL (ref 0–0.9)
MONOCYTES RELATIVE PERCENT: 8.9 % (ref 0–10)
NEUTROPHILS ABSOLUTE: 5.6 K/UL (ref 1.5–7.5)
NEUTROPHILS RELATIVE PERCENT: 71.4 % (ref 50–65)
PDW BLD-RTO: 19.1 % (ref 11.5–14.5)
PHOSPHORUS: 2.9 MG/DL (ref 2.5–4.5)
PLATELET # BLD: 127 K/UL (ref 130–400)
PMV BLD AUTO: 10.1 FL (ref 9.4–12.3)
RBC # BLD: 3.07 M/UL (ref 4.2–5.4)
WBC # BLD: 7.8 K/UL (ref 4.8–10.8)

## 2020-12-07 PROCEDURE — 6370000000 HC RX 637 (ALT 250 FOR IP): Performed by: INTERNAL MEDICINE

## 2020-12-07 PROCEDURE — 83735 ASSAY OF MAGNESIUM: CPT

## 2020-12-07 PROCEDURE — 2700000000 HC OXYGEN THERAPY PER DAY

## 2020-12-07 PROCEDURE — 6360000002 HC RX W HCPCS: Performed by: INTERNAL MEDICINE

## 2020-12-07 PROCEDURE — 85025 COMPLETE CBC W/AUTO DIFF WBC: CPT

## 2020-12-07 PROCEDURE — 1210000000 HC MED SURG R&B

## 2020-12-07 PROCEDURE — 36415 COLL VENOUS BLD VENIPUNCTURE: CPT

## 2020-12-07 PROCEDURE — 6370000000 HC RX 637 (ALT 250 FOR IP): Performed by: PHYSICIAN ASSISTANT

## 2020-12-07 PROCEDURE — 94640 AIRWAY INHALATION TREATMENT: CPT

## 2020-12-07 PROCEDURE — 84100 ASSAY OF PHOSPHORUS: CPT

## 2020-12-07 RX ORDER — HYDROCHLOROTHIAZIDE 25 MG/1
25 TABLET ORAL DAILY
Status: DISCONTINUED | OUTPATIENT
Start: 2020-12-07 | End: 2020-12-08 | Stop reason: HOSPADM

## 2020-12-07 RX ORDER — PREDNISONE 20 MG/1
40 TABLET ORAL DAILY
Status: DISCONTINUED | OUTPATIENT
Start: 2020-12-08 | End: 2020-12-08 | Stop reason: HOSPADM

## 2020-12-07 RX ORDER — LABETALOL HYDROCHLORIDE 5 MG/ML
10 INJECTION, SOLUTION INTRAVENOUS EVERY 6 HOURS PRN
Status: DISCONTINUED | OUTPATIENT
Start: 2020-12-07 | End: 2020-12-08 | Stop reason: HOSPADM

## 2020-12-07 RX ORDER — PREDNISONE 10 MG/1
10 TABLET ORAL DAILY
Status: DISCONTINUED | OUTPATIENT
Start: 2020-12-14 | End: 2020-12-08 | Stop reason: HOSPADM

## 2020-12-07 RX ORDER — METHYLPREDNISOLONE SODIUM SUCCINATE 40 MG/ML
40 INJECTION, POWDER, LYOPHILIZED, FOR SOLUTION INTRAMUSCULAR; INTRAVENOUS DAILY
Status: COMPLETED | OUTPATIENT
Start: 2020-12-07 | End: 2020-12-07

## 2020-12-07 RX ORDER — LANOLIN ALCOHOL/MO/W.PET/CERES
3 CREAM (GRAM) TOPICAL NIGHTLY PRN
Status: DISCONTINUED | OUTPATIENT
Start: 2020-12-07 | End: 2020-12-08 | Stop reason: HOSPADM

## 2020-12-07 RX ORDER — PREDNISONE 20 MG/1
20 TABLET ORAL DAILY
Status: DISCONTINUED | OUTPATIENT
Start: 2020-12-11 | End: 2020-12-08 | Stop reason: HOSPADM

## 2020-12-07 RX ORDER — PREDNISONE 1 MG/1
5 TABLET ORAL DAILY
Status: DISCONTINUED | OUTPATIENT
Start: 2020-12-17 | End: 2020-12-08 | Stop reason: HOSPADM

## 2020-12-07 RX ADMIN — ARFORMOTEROL TARTRATE 15 MCG: 15 SOLUTION RESPIRATORY (INHALATION) at 07:07

## 2020-12-07 RX ADMIN — CARVEDILOL 3.12 MG: 3.12 TABLET, FILM COATED ORAL at 17:16

## 2020-12-07 RX ADMIN — BUDESONIDE 500 MCG: 0.5 SUSPENSION RESPIRATORY (INHALATION) at 19:00

## 2020-12-07 RX ADMIN — IPRATROPIUM BROMIDE 0.5 MG: 0.5 SOLUTION RESPIRATORY (INHALATION) at 15:05

## 2020-12-07 RX ADMIN — IPRATROPIUM BROMIDE 0.5 MG: 0.5 SOLUTION RESPIRATORY (INHALATION) at 19:00

## 2020-12-07 RX ADMIN — IPRATROPIUM BROMIDE 0.5 MG: 0.5 SOLUTION RESPIRATORY (INHALATION) at 11:47

## 2020-12-07 RX ADMIN — Medication 3 MG: at 21:35

## 2020-12-07 RX ADMIN — METHYLPREDNISOLONE SODIUM SUCCINATE 40 MG: 40 INJECTION, POWDER, FOR SOLUTION INTRAMUSCULAR; INTRAVENOUS at 10:00

## 2020-12-07 RX ADMIN — HYDROCHLOROTHIAZIDE 25 MG: 25 TABLET ORAL at 13:07

## 2020-12-07 RX ADMIN — IPRATROPIUM BROMIDE 0.5 MG: 0.5 SOLUTION RESPIRATORY (INHALATION) at 07:07

## 2020-12-07 RX ADMIN — BUDESONIDE 500 MCG: 0.5 SUSPENSION RESPIRATORY (INHALATION) at 07:07

## 2020-12-07 RX ADMIN — CARVEDILOL 3.12 MG: 3.12 TABLET, FILM COATED ORAL at 10:00

## 2020-12-07 RX ADMIN — ARFORMOTEROL TARTRATE 15 MCG: 15 SOLUTION RESPIRATORY (INHALATION) at 18:59

## 2020-12-07 RX ADMIN — MONTELUKAST SODIUM 10 MG: 10 TABLET, FILM COATED ORAL at 21:35

## 2020-12-07 NOTE — PROGRESS NOTES
[facility]    Patient:  Eileen Dempsey  YOB: 1950  Date of Service: 12/7/2020  MRN: [id number]   Acct: [de-identified]   Primary Care Physician: No primary care provider on file. Advance Directive: Full Code  Admit Date: 12/4/2020       Hospital Day: 3        CHIEF COMPLAINT: Shortness of breath      12/7/2020 8:17 AM  Subjective / Interval History:   Shortness of breath improved. Patient with no new complaints. Endorses overall improvement. Laying comfortably in bed in no apparent acute distress. No acute changes or acute overnight event reported. 12/06/2020  Patient extubated and transferred from the ICU: 12/05/2020  Writer assumes care of patient this a.m. Shortness of breath improved. Doing well. No acute changes or acute overnight event reported. Patient with no new complaints. Review of Systems:   Review of Systems  ROS: 14 point review of systems is negative except as specifically addressed above. DIET GENERAL;     Intake/Output Summary (Last 24 hours) at 12/7/2020 0817  Last data filed at 12/6/2020 1836  Gross per 24 hour   Intake 480 ml   Output --   Net 480 ml       Medications:   IV infusion builder 100 mL/hr at 12/06/20 0444     Current Facility-Administered Medications   Medication Dose Route Frequency Provider Last Rate Last Dose    heparin (porcine) injection 5,000 Units  5,000 Units Subcutaneous 3 times per day Que Reese MD        sodium bicarbonate 150 mEq in dextrose 5 % 1,000 mL infusion   Intravenous Continuous Drake Garre,  mL/hr at 12/06/20 0444      montelukast (SINGULAIR) tablet 10 mg  10 mg Oral Nightly Drake Garre, DO   10 mg at 12/06/20 2118    ipratropium (ATROVENT) 0.02 % nebulizer solution 0.5 mg  0.5 mg Nebulization 4x daily Drake Garre, DO   0.5 mg at 12/07/20 0846    Arformoterol Tartrate (BROVANA) nebulizer solution 15 mcg  15 mcg Nebulization BID Drake Garre, DO   15 mcg at 12/07/20 0707    And    budesonide (PULMICORT) nebulizer suspension 500 mcg  0.5 mg Nebulization BID Diomedes Hernandezvais, DO   500 mcg at 12/07/20 8079    influenza quadrivalent subunit vaccine (FLUCELVAX) injection 0.5 mL  0.5 mL Intramuscular Prior to discharge Diomedes Hernandezvais, DO        carvedilol (COREG) tablet 3.125 mg  3.125 mg Oral BID  Diomedes Hernandezvais, DO   3.125 mg at 12/06/20 1805         IV infusion builder 100 mL/hr at 12/06/20 0444      heparin (porcine)  5,000 Units Subcutaneous 3 times per day    montelukast  10 mg Oral Nightly    ipratropium  0.5 mg Nebulization 4x daily    Arformoterol Tartrate  15 mcg Nebulization BID    And    budesonide  0.5 mg Nebulization BID    influenza virus vaccine  0.5 mL Intramuscular Prior to discharge    carvedilol  3.125 mg Oral BID        DIET GENERAL;       Labs:   CBC with DIFF:  Recent Labs     12/05/20  0240 12/06/20  0121 12/07/20  0154   WBC 8.5 8.3 7.8   RBC 3.24* 3.03* 3.07*   HGB 10.1* 9.3* 9.6*   HCT 31.5* 28.8* 29.4*   MCV 97.2 95.0 95.8   MCH 31.2* 30.7 31.3*   MCHC 32.1* 32.3* 32.7*   RDW 19.1* 19.1* 19.1*    144 127*   MPV 10.5 10.4 10.1   NEUTOPHILPCT  --   --  71.4*   LYMPHOPCT  --   --  18.7*   MONOPCT  --   --  8.9   EOSRELPCT  --   --  0.1   BASOPCT  --   --  0.0   NEUTROABS  --   --  5.6   LYMPHSABS  --   --  1.5   MONOSABS  --   --  0.70   EOSABS  --   --  0.00   BASOSABS  --   --  0.00       CMP/BMP:  Recent Labs     12/05/20  0240 12/05/20  1031 12/06/20  0121    139 138   K 5.3* 4.8 4.0   * 109 105   CO2 13* 17* 20*   ANIONGAP 15 13 13   GLUCOSE 106 175* 118*   BUN 29* 28* 29*   CREATININE 1.6* 1.6* 1.6*   LABGLOM 32* 32* 32*   CALCIUM 8.2* 8.5* 8.0*         CRP:  No results for input(s): CRP in the last 72 hours. Sed Rate:  No results for input(s): SEDRATE in the last 72 hours.       HgBA1c:  No components found for: HGBA1C  FLP:  No results found for: TRIG, HDL, LDLCALC, LDLDIRECT, LABVLDL  TSH: Lab Results   Component Value Date    TSH 7.850 12/04/2020     Troponin T:   No results for input(s): TROPONINI in the last 72 hours. Pro-BNP: No results for input(s): BNP in the last 72 hours. INR: No results for input(s): INR in the last 72 hours. ABGs:   Lab Results   Component Value Date    PHART 7.200 12/04/2020    PO2ART 556.0 12/04/2020    OAZ1AZC 50.0 12/04/2020     UA:  Recent Labs     12/04/20  0822   COLORU YELLOW   PHUR 5.5   WBCUA 2   RBCUA 3   BACTERIA NEGATIVE*   CLARITYU Clear   SPECGRAV 1.037   LEUKOCYTESUR Negative   UROBILINOGEN 0.2   BILIRUBINUR Negative   BLOODU Negative   GLUCOSEU Negative         Culture Results:    No results for input(s): CXSURG in the last 720 hours. Blood Culture Recent:   Recent Labs     12/04/20  0713   BC No Growth to date. Any change in status will be called. Cultures:   No results for input(s): CULTURE in the last 72 hours. No results for input(s): BC, [place] in the last 72 hours. No results for input(s): CXSURG in the last 72 hours. Recent Labs     12/05/20  0240 12/05/20  1031 12/07/20  0154   MG 1.2* 2.6* 2.2   PHOS  --   --  2.9     No results for input(s): AST, ALT, ALB, BILITOT, ALKPHOS, ALB in the last 72 hours. RAD:   Ct Head Wo Contrast    Result Date: 12/4/2020  EXAMINATION:  CT HEAD WO CONTRAST  12/4/2020 5:59 PM HISTORY: Altered mental status. TECHNIQUE: Multiple axial images were obtained through the brain without contrast infusion. Multiplanar images were reconstructed. DLP: 711 mGy-cm. Automated exposure control was utilized. COMPARISON: No comparison study. FINDINGS: There are no hemorrhage, edema or mass effect. There are chronic lacunar infarcts in the thalami bilaterally. There is low-density in the hemispheric white matter. There is mild atrophy. There is no significant ventricular dilatation. There is fluid or mucosal thickening in the sphenoid sinuses bilaterally. The mastoid air cells are clear.  There is no calvarial fracture. 1. No intracranial hemorrhage, edema or mass effect. 2. Chronic lacunar infarcts in the thalami bilaterally. 3. Low-density in the hemispheric white matter is nonspecific and likely due to chronic small vessel disease. Vascular calcification is noted. 4. Fluid in the sphenoid sinuses bilaterally suggesting acute sinusitis. Signed by Dr Lisa Bourne on 12/4/2020 6:01 PM    Xr Chest Portable    Result Date: 12/4/2020  XR CHEST PORTABLE 12/4/2020 12:13 PM History: Respiratory failure. Portable chest x-ray compared with 18 April 2015. Endotracheal tube present. The tip lies 27 mm above the booker. Nasogastric tube present and extends to at least the mid stomach, distal tip not visualized. Fully expanded lungs with chronic appearing interstitial disease. No focal infiltrate is seen. No pneumothorax or heart failure. 1. Endotracheal tube and nasogastric tube present. The nasogastric tube placed to the left of midline of the patient is rotated slightly and this is acceptable. 2. Interstitial lung disease with no pneumonia or pneumothorax.  Signed by Dr Horace Nolen on 12/4/2020 12:15 PM      Echo:   pending      Objective:   Vitals:   BP (!) 163/85   Pulse 72   Temp 97.5 °F (36.4 °C) (Temporal)   Resp 16   Ht 5' 3\" (1.6 m)   Wt 108 lb 14.4 oz (49.4 kg)   SpO2 100%   BMI 19.29 kg/m²       Patient Vitals for the past 24 hrs:   BP Temp Temp src Pulse Resp SpO2   12/07/20 0658 (!) 163/85 97.5 °F (36.4 °C) Temporal 72 16 100 %   12/07/20 0050 (!) 150/78 99.2 °F (37.3 °C) Temporal 78 14 98 %   12/06/20 1905 138/80 98.1 °F (36.7 °C) Temporal 62 16 97 %   12/06/20 1140 (!) 161/84 -- -- -- -- --   12/06/20 1138 (!) 151/90 98.5 °F (36.9 °C) -- 58 20 100 %       24HR INTAKE/OUTPUT:      Intake/Output Summary (Last 24 hours) at 12/7/2020 0817  Last data filed at 12/6/2020 1836  Gross per 24 hour   Intake 480 ml   Output --   Net 480 ml       Physical Exam  Vitals signs and nursing note reviewed. Constitutional:       General: She is not in acute distress. Appearance: Normal appearance. She is not ill-appearing, toxic-appearing or diaphoretic. HENT:      Head: Normocephalic and atraumatic. Right Ear: External ear normal.      Left Ear: External ear normal.      Nose: Nose normal. No congestion or rhinorrhea. Mouth/Throat:      Mouth: Mucous membranes are moist.      Pharynx: Oropharynx is clear. No oropharyngeal exudate or posterior oropharyngeal erythema. Eyes:      General: No scleral icterus. Right eye: No discharge. Left eye: No discharge. Extraocular Movements: Extraocular movements intact. Conjunctiva/sclera: Conjunctivae normal.      Pupils: Pupils are equal, round, and reactive to light. Neck:      Musculoskeletal: Normal range of motion and neck supple. No neck rigidity or muscular tenderness. Vascular: No carotid bruit. Cardiovascular:      Rate and Rhythm: Normal rate and regular rhythm. Pulses: Normal pulses. Heart sounds: Normal heart sounds. No murmur. No friction rub. No gallop. Pulmonary:      Effort: Pulmonary effort is normal. No respiratory distress. Breath sounds: No stridor. Wheezing ( Mild bilateral expiratory wheezes) present. No rhonchi or rales. Chest:      Chest wall: No tenderness. Abdominal:      General: Bowel sounds are normal. There is no distension. Palpations: Abdomen is soft. Tenderness: There is no abdominal tenderness. There is no guarding or rebound. Musculoskeletal: Normal range of motion. General: No swelling, tenderness, deformity or signs of injury. Right lower leg: No edema. Left lower leg: No edema. Skin:     General: Skin is warm and dry. Capillary Refill: Capillary refill takes less than 2 seconds. Coloration: Skin is not jaundiced or pale. Findings: No bruising, erythema, lesion or rash.    Neurological:      General: No focal deficit present. Mental Status: She is alert and oriented to person, place, and time. Cranial Nerves: No cranial nerve deficit. Sensory: No sensory deficit. Motor: No weakness. Coordination: Coordination normal.   Psychiatric:         Mood and Affect: Mood normal.         Behavior: Behavior normal.         Thought Content: Thought content normal.         Judgment: Judgment normal.           Assessment/plan:         Hospital Problems           Last Modified POA    HTN (hypertension) 12/6/2020 Yes    Alcohol withdrawal seizure ([address] 12/5/2020 Yes    Respiratory failure ([address] 12/4/2020 Yes    Seizures ([address] 12/6/2020 Yes    Elevated brain natriuretic peptide (BNP) level 12/6/2020 Yes    Hyperkalemia 12/6/2020 Yes    Hypomagnesemia 12/6/2020 Yes          Active Problems:    HTN (hypertension)    Alcohol withdrawal seizure ([address]    Respiratory failure (HCC)    Seizures (HCC)    Elevated brain natriuretic peptide (BNP) level    Hyperkalemia    Hypomagnesemia  Resolved Problems:    * No resolved hospital problems. *        Brief Summary:   Ms Wali Kevin, a pleasant 66-year-old female, history of COPD, transferred from [facility] to [address] [place] [address] (12/04/2020), on account of acute hypoxic and hypercarbic respiratory failure. Patient presented to [facility], on invasive mechanical ventilation. Acute hypoxic and hypercarbic respiratory failure  History of COPD, with acute exacerbation  · Requiring oxygen supplementation to keep SPO2 >89  · Nebulized Bronchodilators scheduled and on as-needed basis. · Ipratropium-Albuterol (DuoNeb's) nebulizer every 6 hours scheduled  · Albuterol nebulizer every 6  hours when necessary for shortness of breath and/or wheezing.   · Arformoterol Tartrate (BROVANA) 15 mcg  Nebs BID   · Budesonide (PULMICORT)  500 mcg  Neb Q12H   · --> Methylprednisolone (Solu-Medrol) 40 mg IV Daily (tapering)  · - No acute infiltrates on Chest X-ray   · - Continue to monitor    History of Essential Hypertension   Resume home regimen   Hydrochlorothiazide 25 mg p.o. daily   Labetalol 10 mg IV Q6H/PRN  For SBP> 180 and/or DP> 110   Continue to monitor      Elevated proBNP  · No history of CHF  · Possibly secondary to CKD  · 2D echocardiogram pending official report    Hyperkalemia  · Resolved  · Monitor potassium level      History of seizures  · Neurology following-appreciate recommendations  · Status post unremarkable EEG (12/05/2020  · Seizure precautions            Continue management of other chronic medical conditions - see above and orders. Advance Directive: Full Code    DIET GENERAL;         Consults Made:   None    DVT prophylaxis: Heparin    Discharge planning: tbd    Time Spent is 25 mins in the examination, evaluation, counseling and review of medications, assessment and plan.      Electronically signed by   Shahla Ceron MD, MPH,   Internal Medicine Hospitalist   12/7/2020 8:17 AM

## 2020-12-08 VITALS
HEART RATE: 78 BPM | DIASTOLIC BLOOD PRESSURE: 74 MMHG | BODY MASS INDEX: 17.58 KG/M2 | RESPIRATION RATE: 17 BRPM | TEMPERATURE: 98.4 F | WEIGHT: 99.2 LBS | HEIGHT: 63 IN | SYSTOLIC BLOOD PRESSURE: 122 MMHG | OXYGEN SATURATION: 94 %

## 2020-12-08 PROBLEM — E83.42 HYPOMAGNESEMIA: Status: RESOLVED | Noted: 2020-12-06 | Resolved: 2020-12-08

## 2020-12-08 PROBLEM — E43 SEVERE MALNUTRITION (HCC): Status: ACTIVE | Noted: 2020-12-08

## 2020-12-08 PROBLEM — J96.90 RESPIRATORY FAILURE (HCC): Status: RESOLVED | Noted: 2020-12-04 | Resolved: 2020-12-08

## 2020-12-08 PROBLEM — E87.5 HYPERKALEMIA: Status: RESOLVED | Noted: 2020-12-06 | Resolved: 2020-12-08

## 2020-12-08 LAB
BASOPHILS ABSOLUTE: 0 K/UL (ref 0–0.2)
BASOPHILS RELATIVE PERCENT: 0.1 % (ref 0–1)
EOSINOPHILS ABSOLUTE: 0 K/UL (ref 0–0.6)
EOSINOPHILS RELATIVE PERCENT: 0.4 % (ref 0–5)
HCT VFR BLD CALC: 28.4 % (ref 37–47)
HEMOGLOBIN: 9.4 G/DL (ref 12–16)
IMMATURE GRANULOCYTES #: 0 K/UL
LV EF: 58 %
LVEF MODALITY: NORMAL
LYMPHOCYTES ABSOLUTE: 1.6 K/UL (ref 1.1–4.5)
LYMPHOCYTES RELATIVE PERCENT: 23.6 % (ref 20–40)
MCH RBC QN AUTO: 30.9 PG (ref 27–31)
MCHC RBC AUTO-ENTMCNC: 33.1 G/DL (ref 33–37)
MCV RBC AUTO: 93.4 FL (ref 81–99)
MONOCYTES ABSOLUTE: 0.6 K/UL (ref 0–0.9)
MONOCYTES RELATIVE PERCENT: 8.5 % (ref 0–10)
NEUTROPHILS ABSOLUTE: 4.5 K/UL (ref 1.5–7.5)
NEUTROPHILS RELATIVE PERCENT: 67.1 % (ref 50–65)
PDW BLD-RTO: 18.7 % (ref 11.5–14.5)
PLATELET # BLD: 129 K/UL (ref 130–400)
PMV BLD AUTO: 10 FL (ref 9.4–12.3)
RBC # BLD: 3.04 M/UL (ref 4.2–5.4)
WBC # BLD: 6.7 K/UL (ref 4.8–10.8)

## 2020-12-08 PROCEDURE — 6360000002 HC RX W HCPCS: Performed by: INTERNAL MEDICINE

## 2020-12-08 PROCEDURE — 6360000004 HC RX CONTRAST MEDICATION: Performed by: INTERNAL MEDICINE

## 2020-12-08 PROCEDURE — 36415 COLL VENOUS BLD VENIPUNCTURE: CPT

## 2020-12-08 PROCEDURE — 94640 AIRWAY INHALATION TREATMENT: CPT

## 2020-12-08 PROCEDURE — 2700000000 HC OXYGEN THERAPY PER DAY

## 2020-12-08 PROCEDURE — 6370000000 HC RX 637 (ALT 250 FOR IP): Performed by: INTERNAL MEDICINE

## 2020-12-08 PROCEDURE — 85025 COMPLETE CBC W/AUTO DIFF WBC: CPT

## 2020-12-08 PROCEDURE — C8929 TTE W OR WO FOL WCON,DOPPLER: HCPCS

## 2020-12-08 RX ORDER — MONTELUKAST SODIUM 10 MG/1
10 TABLET ORAL NIGHTLY
Qty: 30 TABLET | Refills: 0 | Status: SHIPPED | OUTPATIENT
Start: 2020-12-08 | End: 2021-01-07

## 2020-12-08 RX ORDER — PREDNISONE 1 MG/1
5 TABLET ORAL DAILY
Qty: 3 TABLET | Refills: 0 | Status: SHIPPED | OUTPATIENT
Start: 2020-12-17 | End: 2020-12-20

## 2020-12-08 RX ORDER — PREDNISONE 20 MG/1
20 TABLET ORAL DAILY
Qty: 3 TABLET | Refills: 0 | Status: SHIPPED | OUTPATIENT
Start: 2020-12-11 | End: 2020-12-14

## 2020-12-08 RX ORDER — LANOLIN ALCOHOL/MO/W.PET/CERES
10 CREAM (GRAM) TOPICAL DAILY
Qty: 105 TABLET | Refills: 0
Start: 2020-12-08 | End: 2021-01-07

## 2020-12-08 RX ORDER — PREDNISONE 20 MG/1
40 TABLET ORAL DAILY
Qty: 6 TABLET | Refills: 0 | Status: SHIPPED | OUTPATIENT
Start: 2020-12-08 | End: 2020-12-11

## 2020-12-08 RX ORDER — PREDNISONE 10 MG/1
10 TABLET ORAL DAILY
Qty: 3 TABLET | Refills: 0 | Status: SHIPPED | OUTPATIENT
Start: 2020-12-14 | End: 2020-12-17

## 2020-12-08 RX ADMIN — ARFORMOTEROL TARTRATE 15 MCG: 15 SOLUTION RESPIRATORY (INHALATION) at 06:49

## 2020-12-08 RX ADMIN — PERFLUTREN 2.2 MG: 6.52 INJECTION, SUSPENSION INTRAVENOUS at 14:22

## 2020-12-08 RX ADMIN — IPRATROPIUM BROMIDE 0.5 MG: 0.5 SOLUTION RESPIRATORY (INHALATION) at 06:50

## 2020-12-08 RX ADMIN — BUDESONIDE 500 MCG: 0.5 SUSPENSION RESPIRATORY (INHALATION) at 06:50

## 2020-12-08 RX ADMIN — IPRATROPIUM BROMIDE 0.5 MG: 0.5 SOLUTION RESPIRATORY (INHALATION) at 14:48

## 2020-12-08 RX ADMIN — HYDROCHLOROTHIAZIDE 25 MG: 25 TABLET ORAL at 10:34

## 2020-12-08 RX ADMIN — IPRATROPIUM BROMIDE 0.5 MG: 0.5 SOLUTION RESPIRATORY (INHALATION) at 10:44

## 2020-12-08 RX ADMIN — CARVEDILOL 3.12 MG: 3.12 TABLET, FILM COATED ORAL at 10:33

## 2020-12-08 RX ADMIN — PREDNISONE 40 MG: 20 TABLET ORAL at 10:34

## 2020-12-08 NOTE — CARE COORDINATION
Date / Time of Evaluation: 12/8/2020 11:34 AM  Assessment Completed by: Kim Sanchez    Patient Admission Status: Inpatient [101]      Current PCP:  Daryle Buoy  PCP verified? yes    Initial Assessment Completed at bedside with:  pt    Emergency Contacts:  Extended Emergency Contact Information  Primary Emergency Contact: Marcia Briceno  [place] [address] [facility] [place]: [phone]  Relation: Brother/Sister  Preferred language: English   needed? No  Secondary Emergency Contact: Gurvinder [address] Phone: [phone]  Relation: Parent    Advance Directives: Code Status:  Full Code    Financial:  Payor: Ana Rosa Lamb / Plan: Merrimeagan Enrrique / Product Type: *No Product type* /     Pre-Cert required for SNF:  yes    Have Long Term Care Insurance:  no    Pharmacy:   [facility], [facility]  [address]  [address] [address]  Phone: [phone] Fax: [address] [address] [phone] - F [phone]  [address] [place] [address]  Phone: [phone] Fax: [phone]    [facility] [address] [place] [address]  [address]  Phone: [phone] Fax: [phone]      Potential assistance purchasing medications?   no    ADLS:  Support System:   home alone; has father and sister     Current Home Environment:  Home alone  Steps:  no    Plans to RETURN to current housing: yes  Barriers to RETURNING to current housing:  no    Currently ACTIVE with [address]:  no  [facility]:  no    DME Provider:  none    Had HOME OXYGEN prior to admission:  no    Active with HD/PD prior to admission: no  Nephrologist:  no  HD Center:  no    Transition Plan:  transferred from [facility]; currently intubated/ventilated initially; now on the floor     Transportation PLAN for Discharge:  Has no ride home; likely cab    Factors facilitating achievement of predicted outcomes:     Barriers to discharge:  none      Additional CM/SW Notes: Pt has support from her sister and father; but he is 80 years old and sister lives in [place]; she stated she has no one else that can come pick her up; she also stated she cannot afford a cab fare to her home; SW placed call to her insurance to verify if they have transit benefits or not; she only has non-emergent ambulance coverage for a copay of approx 270.00; which she also cannot afford; SW arranged cab voucher to her home; she would also like a walker; and is agreeable to [facility]; but doesn't currently have it. May Simpson and/or her family were provided with choice of provider.         Malik Ambriz [address]  Care Management Department  Ph:  [phone]   Fax: [phone]  Electronically signed by IRINEO Ambriz on 12/8/2020 at 12:58 PM

## 2020-12-08 NOTE — PROGRESS NOTES
Comprehensive Nutrition Assessment    Type and Reason for Visit:  Initial    Nutrition Recommendations/Plan: start ONS    Nutrition Assessment:  Following for low BMI = 17.5. Pt meets criteria for severe malnutrition AEB fat and muscle depletion. Pt is improving though as has been extubated and po intake is increasing. Weight has also improved since admission    Malnutrition Assessment:  Malnutrition Status:  Severe malnutrition    Context:  Chronic Illness     Findings of the 6 clinical characteristics of malnutrition:  Energy Intake:  Mild decrease in energy intake (Comment)  Weight Loss:  Unable to assess     Body Fat Loss:  7 - Severe body fat loss     Muscle Mass Loss:  7 - Severe muscle mass loss    Fluid Accumulation:  No significant fluid accumulation Extremities   Strength:  Not Performed    Estimated Daily Nutrient Needs:  Energy (kcal):  5464-1598 kcals (25-35 kcals/kg); Weight Used for Energy Requirements:  Current     Protein (g):  45-68g; Weight Used for Protein Requirements:  Current        Fluid (ml/day):  4023-8467 ml; Method Used for Fluid Requirements:  1 ml/kcal      Nutrition Related Findings:  very thin appearing      Wounds:  None       Current Nutrition Therapies:    DIET GENERAL;     Anthropometric Measures:  · Height: 5' 3\" (160 cm)  · Current Body Weight: 99 lb 3 oz (45 kg)   · Admission Body Weight: 92 lb 1 oz (41.8 kg)    · Usual Body Weight: 100 lb (45.4 kg)(2/2020)     · Ideal Body Weight: 115 lbs; % Ideal Body Weight 86.2 %   · BMI: 17.6  · BMI Categories: Underweight (BMI less than 22) age over 72       Nutrition Diagnosis:   · Severe malnutrition related to catabolic illness, acute injury/trauma as evidenced by BMI, severe loss of subcutaneous fat, severe muscle loss      Nutrition Interventions:   Food and/or Nutrient Delivery:  Continue Current Diet, Start Oral Nutrition Supplement  Nutrition Education/Counseling:  No recommendation at this time   Coordination of Nutrition Care:  Continue to monitor while inpatient    Goals:  PO intake 50% for all meals.   Weight stable or increase 1-3# per week       Nutrition Monitoring and Evaluation:   Behavioral-Environmental Outcomes:  None Identified   Food/Nutrient Intake Outcomes:  Food and Nutrient Intake, Supplement Intake  Physical Signs/Symptoms Outcomes:  Nutrition Focused Physical Findings, Skin, Weight     Discharge Planning:    Continue current diet     Electronically signed by Nithin Brizuela MS, RD, LD on 12/8/20 at 8:21 AM CST    Contact: [phone]

## 2020-12-08 NOTE — DISCHARGE SUMMARY
Zita May  :  1950  MRN:  [id number]    Admit date:  2020  Discharge date:  2020       Admitting Physician:  No admitting provider for patient encounter. Advance Directive: Full Code    Consults Made:   IP CONSULT TO HOME CARE NEEDS      Primary Care Physician:  Khari Kelley    Discharge Diagnoses:  Principal Problem (Resolved):    Respiratory failure ([facility]  Active Problems:    HTN (hypertension)    Alcohol withdrawal seizure ([facility]    Seizures ([facility]    Elevated brain natriuretic peptide (BNP) level    Severe malnutrition ([facility])  Resolved Problems:    Hyperkalemia    Hypomagnesemia            Pertinent Labs:  CBC with DIFF:  Recent Labs     20  0121 20  0154 20  0312   WBC 8.3 7.8 6.7   RBC 3.03* 3.07* 3.04*   HGB 9.3* 9.6* 9.4*   HCT 28.8* 29.4* 28.4*   MCV 95.0 95.8 93.4   MCH 30.7 31.3* 30.9   MCHC 32.3* 32.7* 33.1   RDW 19.1* 19.1* 18.7*    127* 129*   MPV 10.4 10.1 10.0   NEUTOPHILPCT  --  71.4* 67.1*   LYMPHOPCT  --  18.7* 23.6   MONOPCT  --  8.9 8.5   EOSRELPCT  --  0.1 0.4   BASOPCT  --  0.0 0.1   NEUTROABS  --  5.6 4.5   LYMPHSABS  --  1.5 1.6   MONOSABS  --  0.70 0.60   EOSABS  --  0.00 0.00   BASOSABS  --  0.00 0.00       CMP/BMP:  Recent Labs     20  0121      K 4.0      CO2 20*   ANIONGAP 13   GLUCOSE 118*   BUN 29*   CREATININE 1.6*   LABGLOM 32*   CALCIUM 8.0*         CRP:  No results for input(s): CRP in the last 72 hours. Sed Rate:  No results for input(s): SEDRATE in the last 72 hours. HgBA1c:  No components found for: HGBA1C  FLP:  No results found for: TRIG, HDL, LDLCALC, LDLDIRECT, LABVLDL  TSH:    Lab Results   Component Value Date    TSH 7.850 2020     Troponin T: No results for input(s): TROPONINI in the last 72 hours. Pro-BNP: No results for input(s): BNP in the last 72 hours. INR: No results for input(s): INR in the last 72 hours.   ABGs:   Lab Results   Component Value Date    PHART 7.200 2020    PO2ART 556.0 12/04/2020    RUB9BBH 50.0 12/04/2020     UA:No results for input(s): NITRITE, COLORU, PHUR, LABCAST, WBCUA, RBCUA, MUCUS, TRICHOMONAS, YEAST, BACTERIA, CLARITYU, SPECGRAV, LEUKOCYTESUR, UROBILINOGEN, BILIRUBINUR, BLOODU, GLUCOSEU, AMORPHOUS in the last 72 hours. Invalid input(s): Estefanía Wilson      Culture Results:    No results for input(s): CXSURG in the last 720 hours. Blood Culture Recent:   Recent Labs     12/04/20  0713   BC No Growth to date. Any change in status will be called. Cultures:   No results for input(s): CULTURE in the last 72 hours. No results for input(s): BC, Gary Plan in the last 72 hours. No results for input(s): CXSURG in the last 72 hours. Recent Labs     12/07/20  0154   MG 2.2   PHOS 2.9     No results for input(s): AST, ALT, ALB, BILITOT, ALKPHOS, ALB in the last 72 hours. Significant Diagnostic Studies:   Ct Head Wo Contrast    Result Date: 12/4/2020  EXAMINATION:  CT HEAD WO CONTRAST  12/4/2020 5:59 PM HISTORY: Altered mental status. TECHNIQUE: Multiple axial images were obtained through the brain without contrast infusion. Multiplanar images were reconstructed. DLP: 711 mGy-cm. Automated exposure control was utilized. COMPARISON: No comparison study. FINDINGS: There are no hemorrhage, edema or mass effect. There are chronic lacunar infarcts in the thalami bilaterally. There is low-density in the hemispheric white matter. There is mild atrophy. There is no significant ventricular dilatation. There is fluid or mucosal thickening in the sphenoid sinuses bilaterally. The mastoid air cells are clear. There is no calvarial fracture. 1. No intracranial hemorrhage, edema or mass effect. 2. Chronic lacunar infarcts in the thalami bilaterally. 3. Low-density in the hemispheric white matter is nonspecific and likely due to chronic small vessel disease. Vascular calcification is noted. 4. Fluid in the sphenoid sinuses bilaterally suggesting acute sinusitis.  Signed by  Cat Westbrook on 12/4/2020 6:01 PM    Xr Chest Portable    Result Date: 12/4/2020  XR CHEST PORTABLE 12/4/2020 12:13 PM History: Respiratory failure. Portable chest x-ray compared with 18 April 2015. Endotracheal tube present. The tip lies 27 mm above the booker. Nasogastric tube present and extends to at least the mid stomach, distal tip not visualized. Fully expanded lungs with chronic appearing interstitial disease. No focal infiltrate is seen. No pneumothorax or heart failure. 1. Endotracheal tube and nasogastric tube present. The nasogastric tube placed to the left of midline of the patient is rotated slightly and this is acceptable. 2. Interstitial lung disease with no pneumonia or pneumothorax. Signed by Dr Jeremy French on 12/4/2020 12:15 PM    2D Echo - 12/09/2020  Summary   Mitral valve leaflets are mildly thickened with preserved leaflet   mobility. Mildly thickened aortic valve leaflets with preserved leaflet mobility. Tricuspid valve is structurally normal.   Mild tricuspid regurgitation with estimated RVSP of 36 mm Hg. Normal left ventricular size with preserved LV function and an estimated   ejection fraction of approximately 55-60%. Impaired relaxation compatible with diastolic dysfunction. ( reversed E/A   ratio)   No regional wall motion abnormalities. Mild concentric left ventricular hypertrophy. Signature   ----------------------------------------------------------------   Electronically signed by Linda Yusuf MD(Interpreting   physician) on 12/09/2020 04:54 PM   ----------------------------------------------------------------         Hospital Course:   Ms Sal Brewer, a pleasant 66-year-old female, history of COPD, transferred from [facility] to [facility] (12/04/2020), on account of acute hypoxic and hypercarbic respiratory failure.   Patient presented to [facility], on invasive mechanical ventilation.     Acute hypoxic and hypercarbic respiratory failure  History of COPD, with acute exacerbation  · Nebulized Bronchodilators scheduled and on as-needed basis. · Ipratropium-Albuterol (DuoNeb's) nebulizer every 6 hours scheduled  · Albuterol nebulizer every 6  hours when necessary for shortness of breath and/or wheezing. · Arformoterol Tartrate (BROVANA) 15 mcg  Nebs BID   · Budesonide (PULMICORT)  500 mcg  Neb Q12H   · --> Methylprednisolone (Solu-Medrol) 40 mg IV Daily (tapering)  · Prednisone taper upon discharge. · - No acute infiltrates on Chest X-ray   · - Continued to monitor     History of Essential Hypertension  · Resumed home regimen  · Hydrochlorothiazide 25 mg p.o. daily  · Continued to monitor        Elevated proBNP  · No history of CHF  · Possibly secondary to CKD  · 2D echocardiogram (12/09/2020) - Estimated EF of 55-60%. Impaired relaxation compatible with diastolic dysfunction - summary report as noted above  · Follow up with PCP outpatient     Hyperkalemia  · Resolved  · Monitored potassium level        History of seizures  · Neurology following-appreciate recommendations  · Status post unremarkable EEG (12/05/2020  · Seizure precautions     Severe malnutrition  · Management as per dietitian recommendation           Continued management of other chronic medical conditions - see above and orders. Physical Exam:  Vital Signs: /74   Pulse 78   Temp 98.4 °F (36.9 °C) (Temporal)   Resp 16   Ht 5' 3\" (1.6 m)   Wt 99 lb 3.2 oz (45 kg)   SpO2 93%   BMI 17.57 kg/m²   Physical Exam  Vitals signs and nursing note reviewed. Constitutional:       General: She is not in acute distress. Appearance: Normal appearance. She is not ill-appearing, toxic-appearing or diaphoretic. HENT:      Head: Normocephalic and atraumatic. Right Ear: External ear normal.      Left Ear: External ear normal.      Nose: Nose normal. No congestion or rhinorrhea. Mouth/Throat:      Mouth: Mucous membranes are moist.      Pharynx: Oropharynx is clear.  No oropharyngeal exudate or posterior oropharyngeal erythema. Eyes:      General: No scleral icterus. Right eye: No discharge. Left eye: No discharge. Extraocular Movements: Extraocular movements intact. Conjunctiva/sclera: Conjunctivae normal.      Pupils: Pupils are equal, round, and reactive to light. Neck:      Musculoskeletal: Normal range of motion and neck supple. No neck rigidity or muscular tenderness. Vascular: No carotid bruit. Cardiovascular:      Rate and Rhythm: Normal rate and regular rhythm. Pulses: Normal pulses. Heart sounds: Normal heart sounds. No murmur. No friction rub. No gallop. Pulmonary:      Effort: Pulmonary effort is normal. No respiratory distress. Breath sounds: Normal breath sounds. No stridor. No wheezing, rhonchi or rales. Chest:      Chest wall: No tenderness. Abdominal:      General: Bowel sounds are normal. There is no distension. Palpations: Abdomen is soft. Tenderness: There is no abdominal tenderness. There is no guarding or rebound. Musculoskeletal: Normal range of motion. General: No swelling, tenderness, deformity or signs of injury. Right lower leg: No edema. Left lower leg: No edema. Skin:     General: Skin is warm and dry. Capillary Refill: Capillary refill takes less than 2 seconds. Coloration: Skin is not jaundiced or pale. Findings: No bruising, erythema, lesion or rash. Neurological:      General: No focal deficit present. Mental Status: She is alert and oriented to person, place, and time. Cranial Nerves: No cranial nerve deficit. Sensory: No sensory deficit. Motor: No weakness. Coordination: Coordination normal.   Psychiatric:         Mood and Affect: Mood normal.         Behavior: Behavior normal.         Thought Content:  Thought content normal.         Judgment: Judgment normal.         Discharge Medications:       Doe Sprain   Home Medication Instructions CQT:[id number]    Printed on:12/08/20 1445   Medication Information                      anastrozole (ARIMIDEX) 1 MG tablet  Take 1 mg by mouth daily             carvedilol (COREG) 12.5 MG tablet  Take 12.5 mg by mouth daily             hydroCHLOROthiazide (HYDRODIURIL) 25 MG tablet  Take 25 mg by mouth daily             melatonin 3 MG TABS tablet  Take 3.5 tablets by mouth daily             montelukast (SINGULAIR) 10 MG tablet  Take 1 tablet by mouth nightly             predniSONE (DELTASONE) 10 MG tablet  Take 1 tablet by mouth daily for 3 days             predniSONE (DELTASONE) 20 MG tablet  Take 2 tablets by mouth daily for 3 days             predniSONE (DELTASONE) 20 MG tablet  Take 1 tablet by mouth daily for 3 days             predniSONE (DELTASONE) 5 MG tablet  Take 1 tablet by mouth daily for 3 days                 Discharge Instructions: Follow up with Loco Gordon in 7 days. Take medications as directed. Resume activity as tolerated. Diet: DIET GENERAL;  Dietary Nutrition Supplements: Frozen Oral Supplement        DISCHARGE STATUS:    Condition: Good  Disposition: Patient is medically stable and will be discharged [facility]    Extended Emergency Contact Information  Primary Emergency Contact: Izabella Hanley  Address: [address] [place] [address] Phone: [phone]  Relation: Brother/Sister  Preferred language: English   needed? No  Secondary Emergency Contact: Andre Tom   [address] Phone: [phone]  Relation: Parent       Time Spent on discharge is more than 32 mins in the examination, evaluation, counseling and review of medications and discharge plan. Electronically signed by   Raiza Munguia MD, MPH,   Internal Medicine Hospitalist   12/8/2020 2:43 PM      Thank you Loco Gordon for the opportunity to be involved in this patient's care.  If you have any questions or concerns please feel free to contact me at [phone]        [facility] Dragon/Transcription disclaimer:   Much of this encounter note is an electronic transcription/translation of spoken language to printed text.  The electronic translation of spoken language may permit erroneous, or at times, nonsensical words or phrases to be inadvertently transcribed; although attempts have made to review the note for such errors, some may still exist.

## 2020-12-08 NOTE — PROGRESS NOTES
Contacted Echo department and requested echo be obtained as soon as possible. New echo order re-entered.   Electronically signed by Marcus Bruno RN on 12/8/2020 at 12:18 PM

## 2020-12-08 NOTE — PROGRESS NOTES
Patient refused influenza vaccine.     Electronically signed by Viviana Plunkett RN on 12/8/2020 at 3:13 PM

## 2020-12-08 NOTE — PLAN OF CARE
Nutrition Problem #1: Severe malnutrition  Intervention: Food and/or Nutrient Delivery: Continue Current Diet, Start Oral Nutrition Supplement  Nutritional Goals: PO intake 50% for all meals.   Weight stable or increase 1-3# per week

## 2020-12-08 NOTE — PROGRESS NOTES
[facility]    Patient:  Elisa Lopez  YOB: 1950  Date of Service: 12/8/2020  MRN: [id number]   Acct: [de-identified]   Primary Care Physician: Tyler Sanchez  Advance Directive: Full Code  Admit Date: 12/4/2020       Hospital Day: 4        CHIEF COMPLAINT: Shortness of breath      12/8/2020 2:41 PM  Subjective / Interval History:   12/08/2020  No new complaints. Shortness of breath improved. Laying comfortably in bed in no apparent acute distress. No acute changes or acute overnight event reported. Patient endorses overall improvement. ***Follow-up 2D echocardiogram        12/07/2020  Shortness of breath improved. Patient with no new complaints. Endorses overall improvement. Laying comfortably in bed in no apparent acute distress. No acute changes or acute overnight event reported. 12/06/2020  Patient extubated and transferred from the ICU: 12/05/2020  Writer assumes care of patient this a.m. Shortness of breath improved. Doing well. No acute changes or acute overnight event reported. Patient with no new complaints. Review of Systems:   Review of Systems  ROS: 14 point review of systems is negative except as specifically addressed above.     DIET GENERAL;  Dietary Nutrition Supplements: Frozen Oral Supplement    Intake/Output Summary (Last 24 hours) at 12/8/2020 1441  Last data filed at 12/8/2020 1000  Gross per 24 hour   Intake 450 ml   Output --   Net 450 ml       Medications:   IV infusion builder 100 mL/hr at 12/06/20 0444     Current Facility-Administered Medications   Medication Dose Route Frequency Provider Last Rate Last Dose    perflutren lipid microspheres (DEFINITY) injection 2.2 mg  2.2 mg Intravenous ONCE PRN Evelyn Rivas MD   2.2 mg at 12/08/20 1422    predniSONE (DELTASONE) tablet 40 mg  40 mg Oral Daily Evelyn Rivas MD   40 mg at 12/08/20 1034    Followed by   Rogerio Beyer ON 12/11/2020] predniSONE (DELTASONE) tablet 20 mg  20 mg Oral Daily Chayito Mcadams MD        Followed by   Rachna Fernandez ON 12/14/2020] predniSONE (DELTASONE) tablet 10 mg  10 mg Oral Daily Chayito Mcadams MD        Followed by   Rachna Fernandez ON 12/17/2020] predniSONE (DELTASONE) tablet 5 mg  5 mg Oral Daily Chayito Mcadams MD        hydroCHLOROthiazide (HYDRODIURIL) tablet 25 mg  25 mg Oral Daily Chayito Mcadams MD   25 mg at 12/08/20 1034    labetalol (NORMODYNE;TRANDATE) injection 10 mg  10 mg Intravenous Q6H PRN Chayito Mcadams MD        melatonin tablet 3 mg  3 mg Oral Nightly PRN Michi Barrientos PA-C   3 mg at 12/07/20 2135    heparin (porcine) injection 5,000 Units  5,000 Units Subcutaneous 3 times per day Chayito Mcadams MD        sodium bicarbonate 150 mEq in dextrose 5 % 1,000 mL infusion   Intravenous Continuous Mickey Duane,  mL/hr at 12/06/20 0444      montelukast (SINGULAIR) tablet 10 mg  10 mg Oral Nightly Mickey Duane, DO   10 mg at 12/07/20 2135    ipratropium (ATROVENT) 0.02 % nebulizer solution 0.5 mg  0.5 mg Nebulization 4x daily Mickey Duane, DO   0.5 mg at 12/08/20 1044    Arformoterol Tartrate (BROVANA) nebulizer solution 15 mcg  15 mcg Nebulization BID Mickey Duane, DO   15 mcg at 12/08/20 7143    And    budesonide (PULMICORT) nebulizer suspension 500 mcg  0.5 mg Nebulization BID Mickey Duane, DO   500 mcg at 12/08/20 2420    influenza quadrivalent subunit vaccine (FLUCELVAX) injection 0.5 mL  0.5 mL Intramuscular Prior to discharge Mickey Duane, DO        carvedilol (COREG) tablet 3.125 mg  3.125 mg Oral BID WC Mickey Duane, DO   3.125 mg at 12/08/20 1033         IV infusion builder 100 mL/hr at 12/06/20 0444      predniSONE  40 mg Oral Daily    Followed by   Rachna Fernandez ON 12/11/2020] predniSONE  20 mg Oral Daily    Followed by   Rachna Fernandez ON 12/14/2020] predniSONE  10 mg Oral Daily    Followed by   Rachna Fernandez ON 12/17/2020] predniSONE  5 mg Oral Daily    hydroCHLOROthiazide  25 mg Oral Daily    heparin (porcine)  5,000 Units Subcutaneous 3 times per day    montelukast  10 mg Oral Nightly    ipratropium  0.5 mg Nebulization 4x daily    Arformoterol Tartrate  15 mcg Nebulization BID    And    budesonide  0.5 mg Nebulization BID    influenza virus vaccine  0.5 mL Intramuscular Prior to discharge    carvedilol  3.125 mg Oral BID WC       DIET GENERAL;  Dietary Nutrition Supplements: Frozen Oral Supplement       Labs:   CBC with DIFF:  Recent Labs     12/06/20  0121 12/07/20  0154 12/08/20  0312   WBC 8.3 7.8 6.7   RBC 3.03* 3.07* 3.04*   HGB 9.3* 9.6* 9.4*   HCT 28.8* 29.4* 28.4*   MCV 95.0 95.8 93.4   MCH 30.7 31.3* 30.9   MCHC 32.3* 32.7* 33.1   RDW 19.1* 19.1* 18.7*    127* 129*   MPV 10.4 10.1 10.0   NEUTOPHILPCT  --  71.4* 67.1*   LYMPHOPCT  --  18.7* 23.6   MONOPCT  --  8.9 8.5   EOSRELPCT  --  0.1 0.4   BASOPCT  --  0.0 0.1   NEUTROABS  --  5.6 4.5   LYMPHSABS  --  1.5 1.6   MONOSABS  --  0.70 0.60   EOSABS  --  0.00 0.00   BASOSABS  --  0.00 0.00       CMP/BMP:  Recent Labs     12/06/20  0121      K 4.0      CO2 20*   ANIONGAP 13   GLUCOSE 118*   BUN 29*   CREATININE 1.6*   LABGLOM 32*   CALCIUM 8.0*         CRP:  No results for input(s): CRP in the last 72 hours. Sed Rate:  No results for input(s): SEDRATE in the last 72 hours. HgBA1c:  No components found for: HGBA1C  FLP:  No results found for: TRIG, HDL, LDLCALC, LDLDIRECT, LABVLDL  TSH:    Lab Results   Component Value Date    TSH 7.850 12/04/2020     Troponin T:   No results for input(s): TROPONINI in the last 72 hours. Pro-BNP: No results for input(s): BNP in the last 72 hours. INR: No results for input(s): INR in the last 72 hours.   ABGs:   Lab Results   Component Value Date    PHART 7.200 12/04/2020    PO2ART 556.0 12/04/2020    TFI1LYX 50.0 12/04/2020     UA:  No results for input(s): NITRITE, COLORU, PHUR, LABCAST, WBCUA, RBCUA, MUCUS, TRICHOMONAS, YEAST, BACTERIA, CLARITYU, SPECGRAV, LEUKOCYTESUR, UROBILINOGEN, BILIRUBINUR, BLOODU, GLUCOSEU, AMORPHOUS in the last 72 hours. Invalid input(s): Micheline Olson      Culture Results:    No results for input(s): CXSURG in the last 720 hours. Blood Culture Recent:   Recent Labs     12/04/20  0713   BC No Growth to date. Any change in status will be called. Cultures:   No results for input(s): CULTURE in the last 72 hours. No results for input(s): BC, Priscilla Candle in the last 72 hours. No results for input(s): CXSURG in the last 72 hours. Recent Labs     12/07/20  0154   MG 2.2   PHOS 2.9     No results for input(s): AST, ALT, ALB, BILITOT, ALKPHOS, ALB in the last 72 hours. RAD:   Ct Head Wo Contrast    Result Date: 12/4/2020  EXAMINATION:  CT HEAD WO CONTRAST  12/4/2020 5:59 PM HISTORY: Altered mental status. TECHNIQUE: Multiple axial images were obtained through the brain without contrast infusion. Multiplanar images were reconstructed. DLP: 711 mGy-cm. Automated exposure control was utilized. COMPARISON: No comparison study. FINDINGS: There are no hemorrhage, edema or mass effect. There are chronic lacunar infarcts in the thalami bilaterally. There is low-density in the hemispheric white matter. There is mild atrophy. There is no significant ventricular dilatation. There is fluid or mucosal thickening in the sphenoid sinuses bilaterally. The mastoid air cells are clear. There is no calvarial fracture. 1. No intracranial hemorrhage, edema or mass effect. 2. Chronic lacunar infarcts in the thalami bilaterally. 3. Low-density in the hemispheric white matter is nonspecific and likely due to chronic small vessel disease. Vascular calcification is noted. 4. Fluid in the sphenoid sinuses bilaterally suggesting acute sinusitis. Signed by Dr Jordana Blankenship on 12/4/2020 6:01 PM    Xr Chest Portable    Result Date: 12/4/2020  XR CHEST PORTABLE 12/4/2020 12:13 PM History: Respiratory failure.  Portable chest x-ray compared with 18 April 2015. Endotracheal tube present. The tip lies 27 mm above the booker. Nasogastric tube present and extends to at least the mid stomach, distal tip not visualized. Fully expanded lungs with chronic appearing interstitial disease. No focal infiltrate is seen. No pneumothorax or heart failure. 1. Endotracheal tube and nasogastric tube present. The nasogastric tube placed to the left of midline of the patient is rotated slightly and this is acceptable. 2. Interstitial lung disease with no pneumonia or pneumothorax. Signed by Dr Kasi Carlson on 12/4/2020 12:15 PM      Echo:   pending      Objective:   Vitals:   /74   Pulse 78   Temp 98.4 °F (36.9 °C) (Temporal)   Resp 16   Ht 5' 3\" (1.6 m)   Wt 99 lb 3.2 oz (45 kg)   SpO2 93%   BMI 17.57 kg/m²       Patient Vitals for the past 24 hrs:   BP Temp Temp src Pulse Resp SpO2 Height Weight   12/08/20 1245 -- -- -- -- -- 93 % -- --   12/08/20 1107 122/74 98.4 °F (36.9 °C) Temporal 78 16 96 % -- --   12/08/20 1044 -- -- -- -- 16 96 % -- --   12/08/20 0749 -- -- -- -- -- -- 5' 3\" (1.6 m) --   12/08/20 0650 -- -- -- -- 16 96 % -- --   12/08/20 0633 (!) 147/80 98.8 °F (37.1 °C) Temporal 76 16 100 % -- --   12/07/20 2321 -- -- -- -- -- -- -- 99 lb 3.2 oz (45 kg)   12/07/20 2319 (!) 165/87 98.3 °F (36.8 °C) Temporal 72 14 91 % -- --   12/07/20 2137 -- -- -- 72 -- -- -- --   12/07/20 1829 (!) 158/98 97.6 °F (36.4 °C) Temporal 95 16 93 % -- --   12/07/20 1539 128/83 -- -- -- -- -- -- --   12/07/20 1505 -- -- -- -- 16 93 % -- --       24HR INTAKE/OUTPUT:      Intake/Output Summary (Last 24 hours) at 12/8/2020 1441  Last data filed at 12/8/2020 1000  Gross per 24 hour   Intake 450 ml   Output --   Net 450 ml       Physical Exam  Vitals signs and nursing note reviewed. Constitutional:       General: She is not in acute distress. Appearance: Normal appearance. She is not ill-appearing, toxic-appearing or diaphoretic.    HENT:      Head: Normocephalic and atraumatic. Right Ear: External ear normal.      Left Ear: External ear normal.      Nose: Nose normal. No congestion or rhinorrhea. Mouth/Throat:      Mouth: Mucous membranes are moist.      Pharynx: Oropharynx is clear. No oropharyngeal exudate or posterior oropharyngeal erythema. Eyes:      General: No scleral icterus. Right eye: No discharge. Left eye: No discharge. Extraocular Movements: Extraocular movements intact. Conjunctiva/sclera: Conjunctivae normal.      Pupils: Pupils are equal, round, and reactive to light. Neck:      Musculoskeletal: Normal range of motion and neck supple. No neck rigidity or muscular tenderness. Vascular: No carotid bruit. Cardiovascular:      Rate and Rhythm: Normal rate and regular rhythm. Pulses: Normal pulses. Heart sounds: Normal heart sounds. No murmur. No friction rub. No gallop. Pulmonary:      Effort: Pulmonary effort is normal. No respiratory distress. Breath sounds: No stridor. Wheezing ( Mild bilateral expiratory wheezes) present. No rhonchi or rales. Chest:      Chest wall: No tenderness. Abdominal:      General: Bowel sounds are normal. There is no distension. Palpations: Abdomen is soft. Tenderness: There is no abdominal tenderness. There is no guarding or rebound. Musculoskeletal: Normal range of motion. General: No swelling, tenderness, deformity or signs of injury. Right lower leg: No edema. Left lower leg: No edema. Skin:     General: Skin is warm and dry. Capillary Refill: Capillary refill takes less than 2 seconds. Coloration: Skin is not jaundiced or pale. Findings: No bruising, erythema, lesion or rash. Neurological:      General: No focal deficit present. Mental Status: She is alert and oriented to person, place, and time. Cranial Nerves: No cranial nerve deficit. Sensory: No sensory deficit.       Motor: No weakness. Coordination: Coordination normal.   Psychiatric:         Mood and Affect: Mood normal.         Behavior: Behavior normal.         Thought Content: Thought content normal.         Judgment: Judgment normal.           Assessment/plan:         Hospital Problems           Last Modified POA    HTN (hypertension) 12/6/2020 Yes    Alcohol withdrawal seizure ([address] 12/5/2020 Yes    Seizures ([address] 12/6/2020 Yes    Elevated brain natriuretic peptide (BNP) level 12/6/2020 Yes    Severe malnutrition ([address] 12/8/2020 Yes          Principal Problem (Resolved):    Respiratory failure ([address]  Active Problems:    HTN (hypertension)    Alcohol withdrawal seizure ([address]    Seizures ([facility]CC)    Elevated brain natriuretic peptide (BNP) level    Severe malnutrition (HCC)  Resolved Problems:    Hyperkalemia    Hypomagnesemia        Brief Summary:   Ms Jaki Shore, a pleasant 60-year-old female, history of COPD, transferred from [facility] to [address] [place] [address] (12/04/2020), on account of acute hypoxic and hypercarbic respiratory failure. Patient presented to [facility], on invasive mechanical ventilation. Acute hypoxic and hypercarbic respiratory failure  History of COPD, with acute exacerbation  · Requiring oxygen supplementation to keep SPO2 >89  · Nebulized Bronchodilators scheduled and on as-needed basis. · Ipratropium-Albuterol (DuoNeb's) nebulizer every 6 hours scheduled  · Albuterol nebulizer every 6  hours when necessary for shortness of breath and/or wheezing.   · Arformoterol Tartrate (BROVANA) 15 mcg  Nebs BID   · Budesonide (PULMICORT)  500 mcg  Neb Q12H   · --> Methylprednisolone (Solu-Medrol) 40 mg IV Daily (tapering)  · - No acute infiltrates on Chest X-ray   · - Continue to monitor    History of Essential Hypertension   Resume home regimen   Hydrochlorothiazide 25 mg p.o. daily   Labetalol 10 mg IV Q6H/PRN  For SBP> 180 and/or DP> 110   Continue to monitor      Elevated proBNP  · No history of CHF  · Possibly secondary to CKD  · 2D

## 2020-12-08 NOTE — CARE COORDINATION
Maria Victoria Whitaker, [facility] Liaison, spoke with patient regarding MD orders for home health services. Patient refused. States she lives with her brother, does not use any assistive devices at home, able to get her medications, does not feel like she needs the services. Will sign off.   Electronically signed by King Lul RN on 12/8/20 at 2:38 PM CST

## 2020-12-08 NOTE — PLAN OF CARE
Problem: Skin Integrity:  Goal: Will show no infection signs and symptoms  Description: Will show no infection signs and symptoms  Outcome: Ongoing     Problem: Skin Integrity:  Goal: Will show no infection signs and symptoms  Description: Will show no infection signs and symptoms  Outcome: Ongoing  Goal: Absence of new skin breakdown  Description: Absence of new skin breakdown  Outcome: Ongoing     Problem: Falls - Risk of:  Goal: Will remain free from falls  Description: Will remain free from falls  Outcome: Ongoing  Goal: Absence of physical injury  Description: Absence of physical injury  Outcome: Ongoing

## 2020-12-08 NOTE — PROGRESS NOTES
Contacted Echo per MD request, they stated echo was not yet obtained. Notified Dr. Nichole Brock.     Electronically signed by Armando Petersen RN on 12/8/2020 at 11:02 AM

## 2020-12-09 LAB
BLOOD CULTURE, ROUTINE: NORMAL
CULTURE, BLOOD 2: NORMAL